# Patient Record
Sex: FEMALE | Race: WHITE | NOT HISPANIC OR LATINO | Employment: FULL TIME | URBAN - METROPOLITAN AREA
[De-identification: names, ages, dates, MRNs, and addresses within clinical notes are randomized per-mention and may not be internally consistent; named-entity substitution may affect disease eponyms.]

---

## 2017-06-02 ENCOUNTER — OFFICE VISIT (OUTPATIENT)
Dept: CARDIOLOGY | Facility: CLINIC | Age: 57
End: 2017-06-02

## 2017-06-02 VITALS
DIASTOLIC BLOOD PRESSURE: 80 MMHG | HEIGHT: 68 IN | BODY MASS INDEX: 22.28 KG/M2 | HEART RATE: 53 BPM | SYSTOLIC BLOOD PRESSURE: 118 MMHG | WEIGHT: 147 LBS

## 2017-06-02 DIAGNOSIS — I49.3 PVC'S (PREMATURE VENTRICULAR CONTRACTIONS): ICD-10-CM

## 2017-06-02 DIAGNOSIS — R00.2 HEART PALPITATIONS: Primary | ICD-10-CM

## 2017-06-02 PROCEDURE — 93000 ELECTROCARDIOGRAM COMPLETE: CPT | Performed by: NURSE PRACTITIONER

## 2017-06-02 PROCEDURE — 99203 OFFICE O/P NEW LOW 30 MIN: CPT | Performed by: NURSE PRACTITIONER

## 2017-06-02 RX ORDER — ACEBUTOLOL HYDROCHLORIDE 200 MG/1
200 CAPSULE ORAL DAILY
Qty: 90 CAPSULE | Refills: 3 | Status: SHIPPED | OUTPATIENT
Start: 2017-06-02 | End: 2018-06-03 | Stop reason: SDUPTHER

## 2017-06-02 RX ORDER — ACEBUTOLOL HYDROCHLORIDE 200 MG/1
CAPSULE ORAL
Refills: 5 | COMMUNITY
Start: 2017-05-07 | End: 2017-06-02 | Stop reason: SDUPTHER

## 2017-06-02 NOTE — PROGRESS NOTES
Date of Office Visit: 2017  Encounter Provider: CAYETANO Geller  Place of Service: [facility] CARDIOLOGY  Patient Name: Toshia Kebede  :1960    Chief Complaint   Patient presents with   • Palpitations     PVC's   :     HPI: Toshia Kebede is a 56 y.o. female who is a new patient her to establish care. 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 She has followed with Dr. Reeves but wants to establish care here. I have Dr. Reeves's records for review. He has treated her for symptomatic, frequent PVC's. Due to her history of Raynaud's she was tried on verapamil but she had a reaction so she was placed back on acebutolol and has done well on this medication from a cardiac standpoint but her rheumatologist is concerned about her being on a beta blocker with her Raynaud's. She had a stress echo in 2016 which was normal, no ischemia, normal LV systolic function, EF 63%, no atrial enlargement or valvular heart disease. She denies chest pain, shortness of breath, dizziness or syncope. She exercises regularly. She says that she does still have palpitations but they are tolerable and much improved on the acebutolol. As far as her Raynaud's disease, she tried to control her environment and has done well except for one episode where she had to be outside by the river for about 3 hours.       Past Medical History:   Diagnosis Date   • Acne    • Breast cyst    • Disease of thyroid gland    • Fibromuscular dysplasia    • Hypertension    • Hypothyroidism    • Raynaud's disease    • Sjogren's syndrome    • URI (upper respiratory infection)        Past Surgical History:   Procedure Laterality Date   • AUGMENTATION MAMMAPLASTY     • BREAST AUGMENTATION     • WISDOM TOOTH EXTRACTION         Social History     Social History   • Marital status:      Spouse name: N/A   • Number of children: N/A    • Years of education: N/A     Occupational History   • Not on file.     Social History Main Topics   • Smoking status: Never Smoker   • Smokeless tobacco: Not on file   • Alcohol use Yes      Comment: occasionally   • Drug use: No   • Sexual activity: Not on file     Other Topics Concern   • Not on file     Social History Narrative       Family History   Problem Relation Age of Onset   • Hyperlipidemia Mother    • Osteoporosis Mother    • Aneurysm Father      cerebral artery   • Diabetes Father    • Cancer Maternal Aunt      breast   • Heart attack Maternal Grandfather        Review of Systems   Constitution: Negative for chills, fever, malaise/fatigue, weight gain and weight loss.   HENT: Negative for ear pain, headaches, hearing loss, nosebleeds and sore throat.    Eyes: Negative for double vision, pain and visual disturbance.   Cardiovascular: Positive for palpitations. Negative for chest pain, dyspnea on exertion, irregular heartbeat, leg swelling, near-syncope, orthopnea, paroxysmal nocturnal dyspnea and syncope.   Respiratory: Negative for cough, shortness of breath, sleep disturbances due to breathing, snoring and wheezing.    Endocrine: Negative for cold intolerance, heat intolerance and polyuria.   Hematologic/Lymphatic: Negative.    Skin: Negative for itching and rash.   Musculoskeletal: Positive for joint pain. Negative for joint swelling and myalgias.        Raynaud's   Gastrointestinal: Negative for abdominal pain, diarrhea, melena, nausea and vomiting.   Genitourinary: Negative for frequency, hematuria and hesitancy.   Neurological: Negative for excessive daytime sleepiness, light-headedness, numbness, paresthesias and seizures.   Psychiatric/Behavioral: Negative for altered mental status and depression.   Allergic/Immunologic: Negative.    All other systems reviewed and are negative.      Allergies   Allergen Reactions   • Plendil [Felodipine] Swelling     Flushed in face         Current Outpatient  "Prescriptions:   •  acebutolol (SECTRAL) 200 MG capsule, Take 1 capsule by mouth Daily., Disp: 90 capsule, Rfl: 3  •  aspirin 81 MG EC tablet, Take 81 mg by mouth daily., Disp: , Rfl:   •  calcium carbonate-cholecalciferol 500-400 MG-UNIT tablet tablet, Take  by mouth daily., Disp: , Rfl:   •  cholecalciferol (VITAMIN D3) 1000 UNITS tablet, Take 1,000 Units by mouth daily., Disp: , Rfl:   •  estradiol-norethindrone (ACTIVELLA) 1-0.5 MG per tablet, Take 1 tablet by mouth daily., Disp: , Rfl:   •  levothyroxine (SYNTHROID, LEVOTHROID) 112 MCG tablet, Take 112 mcg by mouth daily., Disp: , Rfl:   •  spironolactone (ALDACTONE) 100 MG tablet, Take 100 mg by mouth daily., Disp: , Rfl:      Objective:     Vitals:    06/02/17 0827   BP: 118/80   Pulse: 53   Weight: 147 lb (66.7 kg)   Height: 68\" (172.7 cm)     Body mass index is 22.35 kg/(m^2).    PHYSICAL EXAM:    Vitals Reviewed.   General Appearance: No acute distress, well developed and well nourished.   Eyes: Conjunctiva and lids: No erythema, swelling, or discharge. Sclera non-icteric.   HENT: Atraumatic, normocephalic. External eyes, ears, and nose normal. No hearing loss noted. Mucous membranes normal. Lips not cyanotic. Neck supple with no tenderness.  Respiratory: No signs of respiratory distress. Respiration rhythm and depth normal.   Clear to auscultation. No rales, crackles, rhonchi, or wheezing auscultated.   Cardiovascular:  Jugular Venous Pressure: Normal  Heart Rate and Rhythm: Normal, Heart Sounds: Normal S1 and S2. No S3 or S4 noted.  Murmurs: No murmurs noted. No rubs, thrills, or gallops.   Arterial Pulses: Carotid pulses normal. No carotid bruit noted. Posterior tibialis and dorsalis pedis pulses normal.   Lower Extremities: No edema noted.  Gastrointestinal:  Abdomen soft, non-distended, non-tender. Normal bowel sounds. No hepatomegaly.   Musculoskeletal: Normal movement of extremities  Skin and Nails: General appearance normal. No pallor, cyanosis, " diaphoresis. Skin temperature normal. No clubbing of fingernails.   Psychiatric: Patient alert and oriented to person, place, and time. Speech and behavior appropriate. Normal mood and affect.       ECG 12 Lead  Date/Time: 6/2/2017 9:27 AM  Performed by: SARKIS RAUSCH  Authorized by: SARKIS RAUSCH   Comparison: compared with previous ECG from 5/20/2016  Similar to previous ECG  Comparison to previous ECG: No PVC's on current tracing  Rhythm: sinus bradycardia  BPM: 53  Clinical impression: normal ECG  Comments: Clinical indication: PVC's, palpitations              Assessment:       Diagnosis Plan   1. Heart palpitations     2. PVC's (premature ventricular contractions)            Plan:       She is doing well on the acebutolol. She still had palpitations but nothing like before and they are tolerable. Dr. Christianson and I discussed option with her but think she stay on the beta-blocker at this time and we will see her again in 1 year or sooner should the need arise.     As always, it has been a pleasure to participate in your patient's care.      Sincerely,         CAYETANO Galvin

## 2017-07-06 ENCOUNTER — TRANSCRIBE ORDERS (OUTPATIENT)
Dept: ADMINISTRATIVE | Facility: HOSPITAL | Age: 57
End: 2017-07-06

## 2017-07-06 DIAGNOSIS — Z12.31 VISIT FOR SCREENING MAMMOGRAM: Primary | ICD-10-CM

## 2017-07-27 ENCOUNTER — APPOINTMENT (OUTPATIENT)
Dept: MAMMOGRAPHY | Facility: HOSPITAL | Age: 57
End: 2017-07-27
Attending: SPECIALIST

## 2017-08-14 ENCOUNTER — HOSPITAL ENCOUNTER (OUTPATIENT)
Dept: MAMMOGRAPHY | Facility: HOSPITAL | Age: 57
Discharge: HOME | End: 2017-08-14
Attending: SPECIALIST | Admitting: SPECIALIST

## 2017-08-14 DIAGNOSIS — Z12.31 VISIT FOR SCREENING MAMMOGRAM: ICD-10-CM

## 2017-08-14 PROCEDURE — G0202 SCR MAMMO BI INCL CAD: HCPCS

## 2018-06-04 ENCOUNTER — OFFICE VISIT (OUTPATIENT)
Dept: CARDIOLOGY | Facility: CLINIC | Age: 58
End: 2018-06-04

## 2018-06-04 VITALS
SYSTOLIC BLOOD PRESSURE: 132 MMHG | BODY MASS INDEX: 21.37 KG/M2 | DIASTOLIC BLOOD PRESSURE: 80 MMHG | HEART RATE: 56 BPM | WEIGHT: 141 LBS | HEIGHT: 68 IN

## 2018-06-04 DIAGNOSIS — I49.3 PVC'S (PREMATURE VENTRICULAR CONTRACTIONS): ICD-10-CM

## 2018-06-04 DIAGNOSIS — R00.2 HEART PALPITATIONS: Primary | ICD-10-CM

## 2018-06-04 PROCEDURE — 93000 ELECTROCARDIOGRAM COMPLETE: CPT | Performed by: INTERNAL MEDICINE

## 2018-06-04 PROCEDURE — 99213 OFFICE O/P EST LOW 20 MIN: CPT | Performed by: INTERNAL MEDICINE

## 2018-06-04 RX ORDER — ACEBUTOLOL HYDROCHLORIDE 200 MG/1
200 CAPSULE ORAL DAILY
Qty: 90 CAPSULE | Refills: 3 | Status: SHIPPED | OUTPATIENT
Start: 2018-06-04 | End: 2019-02-01 | Stop reason: SDUPTHER

## 2018-06-04 NOTE — PROGRESS NOTES
Date of Office Visit: 2018  Encounter Provider: Ethan Christianson MD  Place of Service: [facility] CARDIOLOGY  Patient Name: Toshia Kebede  : 1960    Subjective:     Encounter Date:2018      Patient ID: Toshia Kebede is a 57 y.o. female who has a cc of PVCs and palp and Raynaud's and Sjorgen's   PVCs are there but not as severe and most of the time she does not notice them except for at times of stress  BB helps a lot     The patient had a good year.   No anginal chest pain,   No sig quintaan,   No soa,   No fainting,  No orthostasis.   No edema.   Exercise tolerance: good. / runs three miles at a time     There have been no hospital admission since the last visit.     There have been no bleeding events.       Past Medical History:   Diagnosis Date   • Acne    • Breast cyst    • Disease of thyroid gland    • Fibromuscular dysplasia    • Hypertension    • Hypothyroidism    • Raynaud's disease    • Sjogren's syndrome    • URI (upper respiratory infection)        Social History     Social History   • Marital status:      Spouse name: N/A   • Number of children: N/A   • Years of education: N/A     Occupational History   • Not on file.     Social History Main Topics   • Smoking status: Never Smoker   • Smokeless tobacco: Not on file   • Alcohol use Yes      Comment: occasionally   • Drug use: No   • Sexual activity: Not on file     Other Topics Concern   • Not on file     Social History Narrative   • No narrative on file       Review of Systems   Constitution: Negative for fever and night sweats.   HENT: Negative for ear pain and stridor.    Eyes: Negative for discharge and visual halos.   Cardiovascular: Negative for cyanosis.   Respiratory: Negative for hemoptysis and sputum production.    Hematologic/Lymphatic: Negative for adenopathy.   Skin: Negative for nail changes and unusual hair distribution.   Musculoskeletal: Negative for gout and joint swelling.  "  Gastrointestinal: Negative for bowel incontinence and flatus.   Genitourinary: Negative for dysuria and flank pain.   Neurological: Negative for seizures and tremors.   Psychiatric/Behavioral: Negative for altered mental status. The patient is not nervous/anxious.             Objective:     Vitals:    06/04/18 0903   BP: 132/80   Pulse: 56   Weight: 64 kg (141 lb)   Height: 172.7 cm (68\")         Physical Exam   Constitutional: She is oriented to person, place, and time.   HENT:   Head: Normocephalic and atraumatic.   Eyes: Right eye exhibits no discharge. Left eye exhibits no discharge.   Neck: No JVD present. No thyromegaly present.   Cardiovascular: Normal rate and regular rhythm.  Exam reveals no gallop and no friction rub.    No murmur heard.  Pulmonary/Chest: Effort normal and breath sounds normal. She has no rales.   Abdominal: Soft. Bowel sounds are normal. There is no tenderness.   Musculoskeletal: Normal range of motion. She exhibits no edema or deformity.   Neurological: She is alert and oriented to person, place, and time. She exhibits normal muscle tone.   Skin: Skin is warm and dry. No erythema.   Psychiatric: She has a normal mood and affect. Her behavior is normal. Thought content normal.         ECG 12 Lead  Date/Time: 6/4/2018 9:40 AM  Performed by: PAM JOSE  Authorized by: PAM JOSE   Comparison: compared with previous ECG   Similar to previous ECG  Rhythm: sinus rhythm  Rate: normal  Conduction: conduction normal  ST Segments: ST segments normal  T Waves: T waves normal  QRS axis: normal  Clinical impression: normal ECG            Lab Review:       Assessment:          Diagnosis Plan   1. Heart palpitations     2. PVC's (premature ventricular contractions)            Plan:         She is doing fine.   PVCs under control w beta-blocker     She sees Dr barber for her FMD               "

## 2018-08-03 ENCOUNTER — TRANSCRIBE ORDERS (OUTPATIENT)
Dept: ADMINISTRATIVE | Facility: HOSPITAL | Age: 58
End: 2018-08-03

## 2018-08-03 DIAGNOSIS — Z12.31 VISIT FOR SCREENING MAMMOGRAM: Primary | ICD-10-CM

## 2018-08-15 ENCOUNTER — HOSPITAL ENCOUNTER (OUTPATIENT)
Dept: MAMMOGRAPHY | Facility: HOSPITAL | Age: 58
Discharge: HOME | End: 2018-08-15
Attending: SPECIALIST | Admitting: SPECIALIST

## 2018-08-15 DIAGNOSIS — Z12.31 VISIT FOR SCREENING MAMMOGRAM: ICD-10-CM

## 2018-08-15 PROCEDURE — 77067 SCR MAMMO BI INCL CAD: CPT

## 2018-08-15 PROCEDURE — 77063 BREAST TOMOSYNTHESIS BI: CPT

## 2019-02-01 RX ORDER — ACEBUTOLOL HYDROCHLORIDE 200 MG/1
CAPSULE ORAL
Qty: 90 CAPSULE | Refills: 1 | Status: SHIPPED | OUTPATIENT
Start: 2019-02-01 | End: 2019-05-09 | Stop reason: DRUGHIGH

## 2019-05-13 RX ORDER — ACEBUTOLOL HYDROCHLORIDE 200 MG/1
200 CAPSULE ORAL 2 TIMES DAILY
Qty: 90 CAPSULE | Refills: 1 | OUTPATIENT
Start: 2019-05-13

## 2019-06-05 ENCOUNTER — OFFICE VISIT (OUTPATIENT)
Dept: CARDIOLOGY | Facility: CLINIC | Age: 59
End: 2019-06-05

## 2019-06-05 VITALS
BODY MASS INDEX: 20.92 KG/M2 | DIASTOLIC BLOOD PRESSURE: 80 MMHG | WEIGHT: 138 LBS | HEIGHT: 68 IN | SYSTOLIC BLOOD PRESSURE: 120 MMHG | HEART RATE: 61 BPM

## 2019-06-05 DIAGNOSIS — R00.2 HEART PALPITATIONS: ICD-10-CM

## 2019-06-05 DIAGNOSIS — I49.3 PVC'S (PREMATURE VENTRICULAR CONTRACTIONS): Primary | ICD-10-CM

## 2019-06-05 PROCEDURE — 93000 ELECTROCARDIOGRAM COMPLETE: CPT | Performed by: NURSE PRACTITIONER

## 2019-06-05 PROCEDURE — 99213 OFFICE O/P EST LOW 20 MIN: CPT | Performed by: NURSE PRACTITIONER

## 2019-06-05 RX ORDER — ACEBUTOLOL HYDROCHLORIDE 200 MG/1
200 CAPSULE ORAL DAILY
Qty: 90 CAPSULE | Refills: 3 | Status: SHIPPED | OUTPATIENT
Start: 2019-06-05 | End: 2019-08-16 | Stop reason: SDUPTHER

## 2019-06-05 RX ORDER — ACEBUTOLOL HYDROCHLORIDE 200 MG/1
200 CAPSULE ORAL 2 TIMES DAILY
Refills: 1 | COMMUNITY
Start: 2019-05-21 | End: 2019-06-05

## 2019-06-05 RX ORDER — ACEBUTOLOL HYDROCHLORIDE 200 MG/1
200 CAPSULE ORAL DAILY
COMMUNITY
End: 2019-06-05 | Stop reason: SDUPTHER

## 2019-06-05 NOTE — PROGRESS NOTES
Date of Office Visit: 2019  Encounter Provider: CAYETANO Geller  Place of Service: [facility] CARDIOLOGY  Patient Name: Toshia Kebede  :1960    Chief Complaint   Patient presents with   • Palpitations   :     HPI: Toshia Kebede is a 58 y.o. female who is a patient of Dr. Tobias with a history of PVCs and palpitations as well as Raynaud's and Sjorgen's.  She presents today for annual follow-up.    She reports that she is doing well.  She still has some rotations which she notices mostly when she is under a significant amount of stress or if she forgets to take her medication.  She says they are well controlled on the acebutolol.  She denies any chest pain, significant dyspnea, PND, orthopnea or edema.    She exercises regularly without difficulty.     Past Medical History:   Diagnosis Date   • Acne    • Breast cyst    • Disease of thyroid gland    • Fibromuscular dysplasia (CMS/HCC)    • Heart palpitations    • Hypertension    • Hypothyroidism    • PVC's (premature ventricular contractions)    • Raynaud's disease    • Sjogren's syndrome (CMS/HCC)    • URI (upper respiratory infection)        Past Surgical History:   Procedure Laterality Date   • AUGMENTATION MAMMAPLASTY     • BREAST AUGMENTATION     • WISDOM TOOTH EXTRACTION         Social History     Socioeconomic History   • Marital status:      Spouse name: Not on file   • Number of children: Not on file   • Years of education: Not on file   • Highest education level: Not on file   Tobacco Use   • Smoking status: Never Smoker   Substance and Sexual Activity   • Alcohol use: Yes     Comment: occasionally   • Drug use: No       Family History   Problem Relation Age of Onset   • Hyperlipidemia Mother    • Osteoporosis Mother    • Aneurysm Father         cerebral artery   • Diabetes Father    • Breast cancer Maternal Aunt    • Heart attack Maternal Grandfather        Review of Systems   Constitution:  "Negative for chills, fever and malaise/fatigue.   Cardiovascular: Positive for palpitations. Negative for chest pain, dyspnea on exertion, leg swelling, near-syncope, orthopnea, paroxysmal nocturnal dyspnea and syncope.   Respiratory: Negative for cough and shortness of breath.    Musculoskeletal: Negative for joint pain, joint swelling and myalgias.   Gastrointestinal: Negative for abdominal pain, diarrhea, melena, nausea and vomiting.   Genitourinary: Negative for frequency and hematuria.   Neurological: Negative for light-headedness, numbness, paresthesias and seizures.   Allergic/Immunologic: Negative.    All other systems reviewed and are negative.      Allergies   Allergen Reactions   • Plendil [Felodipine] Swelling     Flushed in face         Current Outpatient Medications:   •  acebutolol (SECTRAL) 200 MG capsule, Take 1 capsule by mouth Daily., Disp: 90 capsule, Rfl: 3  •  aspirin 81 MG EC tablet, Take 81 mg by mouth daily., Disp: , Rfl:   •  calcium carbonate-cholecalciferol 500-400 MG-UNIT tablet tablet, Take  by mouth daily., Disp: , Rfl:   •  cholecalciferol (VITAMIN D3) 1000 UNITS tablet, Take 1,000 Units by mouth daily., Disp: , Rfl:   •  estradiol-norethindrone (ACTIVELLA) 1-0.5 MG per tablet, Take 1 tablet by mouth daily., Disp: , Rfl:   •  levothyroxine (SYNTHROID, LEVOTHROID) 112 MCG tablet, Take 112 mcg by mouth daily., Disp: , Rfl:   •  Multiple Vitamin (MULTI-VITAMIN DAILY PO), Take  by mouth., Disp: , Rfl:   •  spironolactone (ALDACTONE) 100 MG tablet, Take 100 mg by mouth daily., Disp: , Rfl:       Objective:     Vitals:    06/05/19 0933   BP: 120/80   Pulse: 61   Weight: 62.6 kg (138 lb)   Height: 172.7 cm (67.99\")     Body mass index is 20.99 kg/m².    PHYSICAL EXAM:    Vitals Reviewed.   General Appearance: No acute distress, well developed and well nourished.   Eyes: Conjunctiva and lids: No erythema, swelling, or discharge. Sclera non-icteric.   HENT: Atraumatic, normocephalic. External " eyes, ears, and nose normal.   Respiratory: No signs of respiratory distress. Respiration rhythm and depth normal.   Clear to auscultation. No rales, crackles, rhonchi, or wheezing auscultated.   Cardiovascular:  Jugular Venous Pressure: Normal  Heart Rate and Rhythm: Normal, Heart Sounds: Normal S1 and S2. No S3 or S4 noted.  Murmurs: No murmurs noted. No rubs, thrills, or gallops.   Arterial Pulses:  Posterior tibialis and dorsalis pedis pulses normal.   Lower Extremities: No edema noted.  Gastrointestinal:  Abdomen soft, non-distended, non-tender.   Musculoskeletal: Normal movement of extremities  Skin and Nails: General appearance normal. No pallor, cyanosis, diaphoresis. Skin temperature normal. No clubbing of fingernails.   Psychiatric: Patient alert and oriented to person, place, and time. Speech and behavior appropriate. Normal mood and affect.       ECG 12 Lead  Date/Time: 6/5/2019 9:53 AM  Performed by: Joycelyn Patino APRN  Authorized by: Joycelyn Patino APRN   Comparison: compared with previous ECG from 6/4/2019  Similar to previous ECG  Rhythm: sinus rhythm  BPM: 61                Assessment:       Diagnosis Plan   1. PVC's (premature ventricular contractions)     2. Heart palpitations            Plan:       1.-2.  PVCs, palpitations--- well controlled on acebutolol.    Follow-up with Dr. Jennings in 1 year or sooner should the need arise.    As always, it has been a pleasure to participate in your patient's care.      Sincerely,         CAYETANO Galvin

## 2019-08-16 RX ORDER — ACEBUTOLOL HYDROCHLORIDE 200 MG/1
200 CAPSULE ORAL DAILY
Qty: 90 CAPSULE | Refills: 3 | Status: SHIPPED | OUTPATIENT
Start: 2019-08-16 | End: 2020-09-16

## 2019-08-30 ENCOUNTER — TRANSCRIBE ORDERS (OUTPATIENT)
Dept: ADMINISTRATIVE | Facility: HOSPITAL | Age: 59
End: 2019-08-30

## 2019-08-30 DIAGNOSIS — Z12.31 VISIT FOR SCREENING MAMMOGRAM: Primary | ICD-10-CM

## 2019-09-17 ENCOUNTER — HOSPITAL ENCOUNTER (OUTPATIENT)
Dept: MAMMOGRAPHY | Facility: HOSPITAL | Age: 59
Discharge: HOME | End: 2019-09-17
Admitting: SPECIALIST

## 2019-09-17 DIAGNOSIS — Z12.31 VISIT FOR SCREENING MAMMOGRAM: ICD-10-CM

## 2019-09-17 PROCEDURE — 77063 BREAST TOMOSYNTHESIS BI: CPT

## 2019-09-17 PROCEDURE — 77067 SCR MAMMO BI INCL CAD: CPT

## 2020-06-08 ENCOUNTER — OFFICE VISIT (OUTPATIENT)
Dept: CARDIOLOGY | Facility: CLINIC | Age: 60
End: 2020-06-08

## 2020-06-08 VITALS
SYSTOLIC BLOOD PRESSURE: 130 MMHG | HEIGHT: 68 IN | WEIGHT: 141 LBS | DIASTOLIC BLOOD PRESSURE: 82 MMHG | HEART RATE: 44 BPM | BODY MASS INDEX: 21.37 KG/M2

## 2020-06-08 DIAGNOSIS — R00.2 HEART PALPITATIONS: ICD-10-CM

## 2020-06-08 DIAGNOSIS — I49.3 PVC'S (PREMATURE VENTRICULAR CONTRACTIONS): Primary | ICD-10-CM

## 2020-06-08 PROCEDURE — 99213 OFFICE O/P EST LOW 20 MIN: CPT | Performed by: INTERNAL MEDICINE

## 2020-06-08 PROCEDURE — 93000 ELECTROCARDIOGRAM COMPLETE: CPT | Performed by: INTERNAL MEDICINE

## 2020-06-08 NOTE — PROGRESS NOTES
Date of Office Visit: 2020  Encounter Provider: Ethan Christianson MD  Place of Service: [facility] CARDIOLOGY  Patient Name: Toshia Kebede  : 1960    Subjective:     Encounter Date:2020      Patient ID: Toshia Kebede is a 59 y.o. female who has a cc of  PVCs that she reports are under control w meds.     If I forget the meds she feels skips.     She has fibromuscular dysplasia of the carotids and she follows with dr evangelina zamora On low dose asa. Asa helps her.     Few episodes of vertigo. That was positional.         The patient had a good year.   No anginal chest pain,   No sig quintana,   No soa,   No fainting,  No orthostasis.   No edema.   Exercise tolerance: she can run.     There have been no hospital admission since the last visit.     There have been no bleeding events.       Past Medical History:   Diagnosis Date   • Acne    • Breast cyst    • Disease of thyroid gland    • Fibromuscular dysplasia (CMS/HCC)    • Heart palpitations    • Hypertension    • Hypothyroidism    • PVC's (premature ventricular contractions)    • Raynaud's disease    • Sjogren's syndrome (CMS/HCC)    • URI (upper respiratory infection)        Social History     Socioeconomic History   • Marital status:      Spouse name: Not on file   • Number of children: Not on file   • Years of education: Not on file   • Highest education level: Not on file   Tobacco Use   • Smoking status: Never Smoker   • Smokeless tobacco: Never Used   Substance and Sexual Activity   • Alcohol use: Yes     Comment: occasionally   • Drug use: No       Review of Systems   Constitution: Negative for fever and night sweats.   HENT: Negative for ear pain and stridor.    Eyes: Negative for discharge and visual halos.   Cardiovascular: Negative for cyanosis.   Respiratory: Negative for hemoptysis and sputum production.    Hematologic/Lymphatic: Negative for adenopathy.   Skin: Negative for nail changes and unusual hair  "distribution.   Musculoskeletal: Negative for gout and joint swelling.   Gastrointestinal: Negative for bowel incontinence and flatus.   Genitourinary: Negative for dysuria and flank pain.   Neurological: Negative for seizures and tremors.   Psychiatric/Behavioral: Negative for altered mental status. The patient is not nervous/anxious.             Objective:     Vitals:    06/08/20 1159   BP: 130/82   BP Location: Right arm   Patient Position: Sitting   Cuff Size: Large Adult   Pulse: (!) 44   Weight: 64 kg (141 lb)   Height: 172.7 cm (68\")         Physical Exam   Constitutional: She is oriented to person, place, and time.   HENT:   Head: Normocephalic and atraumatic.   Eyes: Right eye exhibits no discharge. Left eye exhibits no discharge.   Neck: No JVD present. No thyromegaly present.   Cardiovascular: Normal rate and regular rhythm. Exam reveals no gallop and no friction rub.   No murmur heard.  Pulmonary/Chest: Effort normal and breath sounds normal. She has no rales.   Abdominal: Soft. Bowel sounds are normal. There is no tenderness.   Musculoskeletal: Normal range of motion. She exhibits no edema or deformity.   Neurological: She is alert and oriented to person, place, and time. She exhibits normal muscle tone.   Skin: Skin is warm and dry. No erythema.   Psychiatric: She has a normal mood and affect. Her behavior is normal. Thought content normal.         ECG 12 Lead  Date/Time: 6/8/2020 12:19 PM  Performed by: Ethan Christianson MD  Authorized by: Ethan Christianson MD   Comparison: compared with previous ECG   Similar to previous ECG  Rhythm: sinus rhythm  Rate: normal  Conduction: conduction normal  ST Segments: ST segments normal  T Waves: T waves normal  QRS axis: normal    Clinical impression: normal ECG            Lab Review:       Assessment:          Diagnosis Plan   1. PVC's (premature ventricular contractions)     2. Heart palpitations            Plan:     PVCs well controlled     EXam and ecg normal "     Vasc following the carotids

## 2020-09-16 RX ORDER — ACEBUTOLOL HYDROCHLORIDE 200 MG/1
CAPSULE ORAL
Qty: 90 CAPSULE | Refills: 3 | Status: SHIPPED | OUTPATIENT
Start: 2020-09-16 | End: 2021-09-23

## 2020-12-23 ENCOUNTER — TRANSCRIBE ORDERS (OUTPATIENT)
Dept: ADMINISTRATIVE | Facility: HOSPITAL | Age: 60
End: 2020-12-23

## 2020-12-23 DIAGNOSIS — Z12.31 VISIT FOR SCREENING MAMMOGRAM: Primary | ICD-10-CM

## 2020-12-23 DIAGNOSIS — Z78.0 MENOPAUSE: ICD-10-CM

## 2021-03-02 ENCOUNTER — HOSPITAL ENCOUNTER (OUTPATIENT)
Dept: MAMMOGRAPHY | Facility: HOSPITAL | Age: 61
Discharge: HOME | End: 2021-03-02
Admitting: SPECIALIST

## 2021-03-02 DIAGNOSIS — Z12.31 VISIT FOR SCREENING MAMMOGRAM: ICD-10-CM

## 2021-03-02 PROCEDURE — 77063 BREAST TOMOSYNTHESIS BI: CPT

## 2021-03-02 PROCEDURE — 77067 SCR MAMMO BI INCL CAD: CPT

## 2021-03-04 ENCOUNTER — HOSPITAL ENCOUNTER (OUTPATIENT)
Dept: BONE DENSITY | Facility: HOSPITAL | Age: 61
Discharge: HOME | End: 2021-03-04
Admitting: SPECIALIST

## 2021-03-04 DIAGNOSIS — Z78.0 MENOPAUSE: ICD-10-CM

## 2021-03-04 PROCEDURE — 77080 DXA BONE DENSITY AXIAL: CPT

## 2021-03-22 ENCOUNTER — BULK ORDERING (OUTPATIENT)
Dept: CASE MANAGEMENT | Facility: OTHER | Age: 61
End: 2021-03-22

## 2021-03-22 DIAGNOSIS — Z23 IMMUNIZATION DUE: ICD-10-CM

## 2021-06-14 ENCOUNTER — OFFICE VISIT (OUTPATIENT)
Dept: CARDIOLOGY | Facility: CLINIC | Age: 61
End: 2021-06-14

## 2021-06-14 VITALS
DIASTOLIC BLOOD PRESSURE: 84 MMHG | SYSTOLIC BLOOD PRESSURE: 140 MMHG | BODY MASS INDEX: 22.13 KG/M2 | HEIGHT: 68 IN | HEART RATE: 48 BPM | WEIGHT: 146 LBS

## 2021-06-14 DIAGNOSIS — R00.2 HEART PALPITATIONS: ICD-10-CM

## 2021-06-14 DIAGNOSIS — I49.3 PVC'S (PREMATURE VENTRICULAR CONTRACTIONS): Primary | ICD-10-CM

## 2021-06-14 PROCEDURE — 93000 ELECTROCARDIOGRAM COMPLETE: CPT | Performed by: INTERNAL MEDICINE

## 2021-06-14 PROCEDURE — 99213 OFFICE O/P EST LOW 20 MIN: CPT | Performed by: INTERNAL MEDICINE

## 2021-06-14 RX ORDER — LEVOTHYROXINE SODIUM 125 MCG
1 TABLET ORAL DAILY
COMMUNITY
Start: 2021-06-09 | End: 2023-03-29

## 2021-09-23 RX ORDER — ACEBUTOLOL HYDROCHLORIDE 200 MG/1
CAPSULE ORAL
Qty: 90 CAPSULE | Refills: 3 | Status: SHIPPED | OUTPATIENT
Start: 2021-09-23 | End: 2022-10-03

## 2021-12-27 ENCOUNTER — TRANSCRIBE ORDERS (OUTPATIENT)
Dept: ADMINISTRATIVE | Facility: HOSPITAL | Age: 61
End: 2021-12-27

## 2021-12-27 DIAGNOSIS — Z12.31 VISIT FOR SCREENING MAMMOGRAM: ICD-10-CM

## 2022-03-03 ENCOUNTER — HOSPITAL ENCOUNTER (OUTPATIENT)
Dept: MAMMOGRAPHY | Facility: HOSPITAL | Age: 62
Discharge: HOME | End: 2022-03-03
Admitting: SPECIALIST

## 2022-03-03 DIAGNOSIS — Z12.31 VISIT FOR SCREENING MAMMOGRAM: ICD-10-CM

## 2022-03-03 PROCEDURE — 77063 BREAST TOMOSYNTHESIS BI: CPT

## 2022-03-03 PROCEDURE — 77067 SCR MAMMO BI INCL CAD: CPT

## 2022-05-27 ENCOUNTER — IMMUNIZATION (OUTPATIENT)
Dept: VACCINE CLINIC | Facility: HOSPITAL | Age: 62
End: 2022-05-27

## 2022-05-27 DIAGNOSIS — Z23 NEED FOR VACCINATION: Primary | ICD-10-CM

## 2022-05-27 PROCEDURE — 0054A HC ADM SARSCV2 30MCG TRS-SUCR BOOSTER: CPT | Performed by: INTERNAL MEDICINE

## 2022-05-27 PROCEDURE — 91305 HC SARSCOV2 VAC 30 MCG TRS-SUCR PFIZER: CPT | Performed by: INTERNAL MEDICINE

## 2022-08-02 ENCOUNTER — OFFICE VISIT (OUTPATIENT)
Dept: CARDIOLOGY | Facility: CLINIC | Age: 62
End: 2022-08-02

## 2022-08-02 VITALS
HEIGHT: 68 IN | HEART RATE: 51 BPM | DIASTOLIC BLOOD PRESSURE: 60 MMHG | BODY MASS INDEX: 21.52 KG/M2 | SYSTOLIC BLOOD PRESSURE: 110 MMHG | WEIGHT: 142 LBS

## 2022-08-02 DIAGNOSIS — I49.3 PVC'S (PREMATURE VENTRICULAR CONTRACTIONS): Primary | ICD-10-CM

## 2022-08-02 DIAGNOSIS — R00.2 HEART PALPITATIONS: ICD-10-CM

## 2022-08-02 PROCEDURE — 93000 ELECTROCARDIOGRAM COMPLETE: CPT | Performed by: NURSE PRACTITIONER

## 2022-08-02 PROCEDURE — 99213 OFFICE O/P EST LOW 20 MIN: CPT | Performed by: NURSE PRACTITIONER

## 2022-08-02 NOTE — PROGRESS NOTES
"Date of Office Visit: 2022  Encounter Provider: CAYETANO Geller  Place of Service: [facility] CARDIOLOGY  Patient Name: Toshia Kebede  :1960    Chief Complaint   Patient presents with   • PVC's     1 year f/u   • Palpitations   :     HPI: Toshia Kebede is a 61 y.o. female who follows with Dr. Christianson for PVC's and palpitations. She also has Raynaud's and Sjorgen's.     Presents for annual follow up.      She has had a good year. Palps/pvc's well controlled on acebutolol.     \"I know if I don't take it.\"    She had not had any chest pain, dyspnea, PND, orthopnea, dizziness or edema--until this past weekend, she lifted her 30 lbs grandson and felt a brief chest discomfort over left breast area--resolved quickly, she went to a wedding/dancing that evening, no issues but had some brief similar sensations on ---no associated symptoms.     Suspect musculoskeletal.    She was a running about 3 miles regularly but has really gotten off track since she has been watching the [employer]---she is going to try and get back to regular exercise.      Past Medical History:   Diagnosis Date   • Acne    • Breast cyst    • Disease of thyroid gland    • Fibromuscular dysplasia (HCC)    • Heart palpitations    • Hypertension    • Hypothyroidism    • PVC's (premature ventricular contractions)    • Raynaud's disease    • Sjogren's syndrome (HCC)    • URI (upper respiratory infection)        Past Surgical History:   Procedure Laterality Date   • AUGMENTATION MAMMAPLASTY Bilateral     at age 16   • BREAST AUGMENTATION     • WISDOM TOOTH EXTRACTION         Social History     Socioeconomic History   • Marital status:    Tobacco Use   • Smoking status: Never Smoker   • Smokeless tobacco: Never Used   Vaping Use   • Vaping Use: Never used   Substance and Sexual Activity   • Alcohol use: Yes     Comment: occasionally   • Drug use: No   • Sexual activity: Defer     Birth " control/protection: None       Family History   Problem Relation Age of Onset   • Hyperlipidemia Mother    • Osteoporosis Mother    • Aneurysm Father         cerebral artery   • Diabetes Father    • Breast cancer Maternal Aunt    • Heart attack Maternal Grandfather    • Ovarian cancer Neg Hx        Review of Systems   Constitutional: Negative for chills, fever and malaise/fatigue.   Cardiovascular: Negative for chest pain, dyspnea on exertion, leg swelling, near-syncope, orthopnea, palpitations, paroxysmal nocturnal dyspnea and syncope.   Respiratory: Negative for cough and shortness of breath.    Musculoskeletal: Negative for joint pain, joint swelling and myalgias.   Gastrointestinal: Negative for abdominal pain, diarrhea, melena, nausea and vomiting.   Genitourinary: Negative for frequency and hematuria.   Neurological: Negative for light-headedness, numbness, paresthesias and seizures.   Allergic/Immunologic: Negative.    All other systems reviewed and are negative.      Allergies   Allergen Reactions   • Plendil [Felodipine] Swelling     Flushed in face         Current Outpatient Medications:   •  acebutolol (SECTRAL) 200 MG capsule, TAKE 1 CAPSULE BY MOUTH EVERY DAY, Disp: 90 capsule, Rfl: 3  •  aspirin 81 MG EC tablet, Take 81 mg by mouth daily., Disp: , Rfl:   •  calcium carbonate-cholecalciferol 500-400 MG-UNIT tablet tablet, Take  by mouth daily., Disp: , Rfl:   •  cholecalciferol (VITAMIN D3) 1000 UNITS tablet, Take 1,000 Units by mouth daily., Disp: , Rfl:   •  estradiol-norethindrone (ACTIVELLA) 1-0.5 MG per tablet, Take 0.5 tablets by mouth Daily., Disp: , Rfl:   •  Multiple Vitamin (MULTI-VITAMIN DAILY PO), Take  by mouth., Disp: , Rfl:   •  spironolactone (ALDACTONE) 100 MG tablet, Take 200 mg by mouth Daily., Disp: , Rfl:   •  Synthroid 125 MCG tablet, Take 1 tablet by mouth Daily., Disp: , Rfl:       Objective:     Vitals:    08/02/22 1149   BP: 110/60   BP Location: Right arm   Patient Position:  "Sitting   Pulse: 51   Weight: 64.4 kg (142 lb)   Height: 172.7 cm (68\")     Body mass index is 21.59 kg/m².    PHYSICAL EXAM:    Vitals Reviewed.   General Appearance: No acute distress, well developed and well nourished.   Eyes: Conjunctiva and lids: No erythema, swelling, or discharge. Sclera non-icteric.   HENT: Atraumatic, normocephalic. External eyes, ears, and nose normal.   Respiratory: No signs of respiratory distress. Respiration rhythm and depth normal.   Clear to auscultation. No rales, crackles, rhonchi, or wheezing auscultated.   Cardiovascular:  Heart Rate and Rhythm: Normal, Heart Sounds: Normal S1 and S2. No S3 or S4 noted.  Murmurs: No murmurs noted. No rubs, thrills, or gallops.   Arterial Pulses:  Posterior tibialis and dorsalis pedis pulses normal.   Lower Extremities: No edema noted.  Gastrointestinal:  Abdomen soft, non-distended, non-tender.   Musculoskeletal: Normal movement of extremities  Skin: Warm and dry.   Psychiatric: Patient alert and oriented to person, place, and time. Speech and behavior appropriate. Normal mood and affect.       ECG 12 Lead    Date/Time: 8/2/2022 12:15 PM  Performed by: Joycelyn Patino APRN  Authorized by: Joycelyn Patino APRN   Comparison: compared with previous ECG   Similar to previous ECG  Rhythm: sinus rhythm  BPM: 51                Assessment:       Diagnosis Plan   1. PVC's (premature ventricular contractions)     2. Heart palpitations            Plan:       1-2. PVC's, palps---well controlled on acebutolol.     Follow up with Dr. Christianson in 1 year.     As always, it has been a pleasure to participate in your patient's care.      Sincerely,         CAYETANO Galvin  "

## 2022-10-03 RX ORDER — ACEBUTOLOL HYDROCHLORIDE 200 MG/1
CAPSULE ORAL
Qty: 90 CAPSULE | Refills: 3 | Status: SHIPPED | OUTPATIENT
Start: 2022-10-03

## 2022-12-29 ENCOUNTER — TRANSCRIBE ORDERS (OUTPATIENT)
Dept: ADMINISTRATIVE | Facility: HOSPITAL | Age: 62
End: 2022-12-29
Payer: COMMERCIAL

## 2022-12-29 DIAGNOSIS — Z12.31 VISIT FOR SCREENING MAMMOGRAM: Primary | ICD-10-CM

## 2023-03-06 ENCOUNTER — HOSPITAL ENCOUNTER (OUTPATIENT)
Dept: MAMMOGRAPHY | Facility: HOSPITAL | Age: 63
Discharge: HOME | End: 2023-03-06
Admitting: SPECIALIST
Payer: COMMERCIAL

## 2023-03-06 DIAGNOSIS — Z12.31 VISIT FOR SCREENING MAMMOGRAM: ICD-10-CM

## 2023-03-06 PROCEDURE — 77063 BREAST TOMOSYNTHESIS BI: CPT

## 2023-03-06 PROCEDURE — 77067 SCR MAMMO BI INCL CAD: CPT

## 2023-03-29 DIAGNOSIS — E03.9 HYPOTHYROIDISM, UNSPECIFIED TYPE: Primary | ICD-10-CM

## 2023-03-29 NOTE — TELEPHONE ENCOUNTER
Rx Refill Note  Requested Prescriptions     Pending Prescriptions Disp Refills   • Synthroid 125 MCG tablet [Pharmacy Med Name: SYNTHROID 0.125MG (125MCG) TABLETS] 30 tablet      Sig: TAKE 1 TABLET BY MOUTH EVERY DAY      Last office visit with prescribing clinician: 8/30/2022  Last labs: 9/1/2022  (Info found on [facility])     Next office visit with prescribing clinician: Not scheduled yet      Parul Jordan MA  03/29/23, 11:28 EDT

## 2023-03-30 RX ORDER — LEVOTHYROXINE SODIUM 125 MCG
TABLET ORAL
Qty: 30 TABLET | Refills: 4 | Status: SHIPPED | OUTPATIENT
Start: 2023-03-30

## 2023-08-04 ENCOUNTER — OFFICE VISIT (OUTPATIENT)
Dept: CARDIOLOGY | Facility: CLINIC | Age: 63
End: 2023-08-04
Payer: COMMERCIAL

## 2023-08-04 VITALS
HEIGHT: 68 IN | BODY MASS INDEX: 21.67 KG/M2 | HEART RATE: 52 BPM | SYSTOLIC BLOOD PRESSURE: 136 MMHG | DIASTOLIC BLOOD PRESSURE: 69 MMHG | WEIGHT: 143 LBS

## 2023-08-04 DIAGNOSIS — I49.3 PVC'S (PREMATURE VENTRICULAR CONTRACTIONS): Primary | ICD-10-CM

## 2023-08-04 DIAGNOSIS — R00.2 HEART PALPITATIONS: ICD-10-CM

## 2023-08-04 PROCEDURE — 93000 ELECTROCARDIOGRAM COMPLETE: CPT | Performed by: INTERNAL MEDICINE

## 2023-08-04 PROCEDURE — 99213 OFFICE O/P EST LOW 20 MIN: CPT | Performed by: INTERNAL MEDICINE

## 2023-08-04 RX ORDER — SILDENAFIL CITRATE 20 MG/1
1 TABLET ORAL DAILY
COMMUNITY
Start: 2023-07-26

## 2023-09-05 DIAGNOSIS — E03.9 HYPOTHYROIDISM, UNSPECIFIED TYPE: ICD-10-CM

## 2023-09-05 RX ORDER — LEVOTHYROXINE SODIUM 125 MCG
TABLET ORAL
Qty: 30 TABLET | Refills: 0 | Status: SHIPPED | OUTPATIENT
Start: 2023-09-05

## 2023-09-05 NOTE — TELEPHONE ENCOUNTER
Rx Refill Note  Requested Prescriptions     Pending Prescriptions Disp Refills    Synthroid 125 MCG tablet [Pharmacy Med Name: SYNTHROID 0.125MG (125MCG) TABLETS] 30 tablet 4     Sig: TAKE 1 TABLET BY MOUTH EVERY DAY      Last office visit with prescribing clinician: Visit date not found   Last telemedicine visit with prescribing clinician: Visit date not found   Next office visit with prescribing clinician: Visit date not found                         Would you like a call back once the refill request has been completed: [] Yes [] No    If the office needs to give you a call back, can they leave a voicemail: [] Yes [] No    Bridget Sorto MA  09/05/23, 08:05 EDT

## 2023-09-08 ENCOUNTER — OFFICE VISIT (OUTPATIENT)
Dept: FAMILY MEDICINE CLINIC | Facility: CLINIC | Age: 63
End: 2023-09-08
Payer: COMMERCIAL

## 2023-09-08 VITALS
HEIGHT: 68 IN | WEIGHT: 141.2 LBS | SYSTOLIC BLOOD PRESSURE: 131 MMHG | HEART RATE: 52 BPM | BODY MASS INDEX: 21.4 KG/M2 | OXYGEN SATURATION: 100 % | DIASTOLIC BLOOD PRESSURE: 83 MMHG

## 2023-09-08 DIAGNOSIS — E03.9 HYPOTHYROIDISM, UNSPECIFIED TYPE: Primary | ICD-10-CM

## 2023-09-08 DIAGNOSIS — M54.2 NECK PAIN: ICD-10-CM

## 2023-09-08 DIAGNOSIS — Z78.0 POST-MENOPAUSAL: ICD-10-CM

## 2023-09-08 DIAGNOSIS — M25.562 ACUTE PAIN OF LEFT KNEE: ICD-10-CM

## 2023-09-08 NOTE — PROGRESS NOTES
"Chief Complaint  Knee Pain (Left , shooting pain, not constant ), Back Pain (Upper back, pulling sensation at the top of spine ), and Hypothyroidism    Subjective    History of Present Illness {CC  Problem List  Visit  Diagnosis   Encounters  Notes  Medications  Labs  Result Review Imaging  Media :23}     Toshia Kebede presents to [facility] for Knee Pain (Left , shooting pain, not constant ), Back Pain (Upper back, pulling sensation at the top of spine ), and Hypothyroidism.  History of Present Illness   She presents for follow up of hypothyroid. She is on synthroid 125mcg and feels well on current dose. She denies any change in mood or energy.    She also complain of recent flare of posterior neck pain. There was no recent injury. The pain resolved on its own after a few days. She is concerned about possible osteoporosis and has not had her bone density in several years    She also complains of intermittent left knee pain. She denies any recent or past injury. She has started running 3 days a week for exercise and with most recent run had pain after a couple of miles.     She is on sidenafil for raynauds and has done well with this.       Objective     Vital Signs:   /83   Pulse 52   Ht 172.7 cm (68\")   Wt 64 kg (141 lb 3.2 oz)   SpO2 100%   BMI 21.47 kg/m²   Body mass index is 21.47 kg/m².     Physical Exam  Constitutional:       General: She is not in acute distress.  Cardiovascular:      Rate and Rhythm: Normal rate and regular rhythm.      Heart sounds: No murmur heard.  Pulmonary:      Effort: No respiratory distress.      Breath sounds: Normal breath sounds.   Musculoskeletal:      Cervical back: No spasms or tenderness.      Lumbar back: No tenderness.      Right knee: No effusion. Normal range of motion.      Left knee: No effusion. Normal range of motion.   Neurological:      General: No focal deficit present.      Mental Status: She is alert.    "     Result Review  Data Reviewed:{ Labs  Result Review  Imaging  Med Tab  Media :23}                Assessment and Plan {CC Problem List  Visit Diagnosis  ROS  Review (Popup)  Health Maintenance  Quality  BestPractice  Medications  SmartSets  SnapShot Encounters  Media :23}   Diagnoses and all orders for this visit:    1. Hypothyroidism, unspecified type (Primary)  -     TSH Rfx On Abnormal To Free T4    2. Neck pain    3. Acute pain of left knee    4. Post-menopausal  -     DEXA Bone Density Axial; Future        Patient Instructions   Continue your current medications.   We will order bone density as it is due. However osteoporosis is generally not painful unless fracture has occurred.  I encourage regular stretching and strengthening exercises.  If neck pain recurs, you can take advil or aleeve as needed and would recommend xray and physical therapy.  If knee pain persists, may do best with fast walking as opposed to running.        Patient was given instructions and counseling regarding her condition or for health maintenance advice on the AVS.       Return in about 6 months (around 3/8/2024) for Annual.    Claudia Robbins MD

## 2023-09-08 NOTE — PATIENT INSTRUCTIONS
Continue your current medications.   We will order bone density as it is due. However osteoporosis is generally not painful unless fracture has occurred.  I encourage regular stretching and strengthening exercises.  If neck pain recurs, you can take advil or aleeve as needed and would recommend xray and physical therapy.  If knee pain persists, may do best with fast walking as opposed to running.

## 2023-09-09 LAB — TSH SERPL DL<=0.005 MIU/L-ACNC: 3.08 UIU/ML (ref 0.45–4.5)

## 2023-10-05 RX ORDER — ACEBUTOLOL HYDROCHLORIDE 200 MG/1
CAPSULE ORAL
Qty: 90 CAPSULE | Refills: 3 | Status: SHIPPED | OUTPATIENT
Start: 2023-10-05

## 2023-10-22 DIAGNOSIS — E03.9 HYPOTHYROIDISM, UNSPECIFIED TYPE: ICD-10-CM

## 2023-10-22 RX ORDER — LEVOTHYROXINE SODIUM 125 MCG
TABLET ORAL
Qty: 30 TABLET | Refills: 4 | Status: SHIPPED | OUTPATIENT
Start: 2023-10-22

## 2023-11-27 ENCOUNTER — TELEPHONE (OUTPATIENT)
Dept: FAMILY MEDICINE CLINIC | Facility: CLINIC | Age: 63
End: 2023-11-27
Payer: COMMERCIAL

## 2023-11-27 DIAGNOSIS — E03.9 HYPOTHYROIDISM, UNSPECIFIED TYPE: ICD-10-CM

## 2023-11-27 RX ORDER — LEVOTHYROXINE SODIUM 125 MCG
125 TABLET ORAL DAILY
Qty: 30 TABLET | Refills: 4 | Status: SHIPPED | OUTPATIENT
Start: 2023-11-27

## 2023-11-27 NOTE — TELEPHONE ENCOUNTER
"  Caller: Toshia Kebede    Relationship: Self    Best call back number: [phone]    What was the call regarding: PATIENT STATED THE PHARMACY IS TRYING TO SUBSTITUTE HER SYNTHROID MEDICATION WITH SOMETHING ELSE.    SHE WOULD LIKE TO REQUEST THAT \"DO NOT SUBSTITUTE\" BE PUT ON THIS MEDICATION.    SHE WOULD LIKE A CALL.  "

## 2024-01-10 ENCOUNTER — TRANSCRIBE ORDERS (OUTPATIENT)
Dept: ADMINISTRATIVE | Facility: HOSPITAL | Age: 64
End: 2024-01-10
Payer: COMMERCIAL

## 2024-01-10 DIAGNOSIS — Z12.31 BREAST CANCER SCREENING BY MAMMOGRAM: Primary | ICD-10-CM

## 2024-03-08 ENCOUNTER — HOSPITAL ENCOUNTER (OUTPATIENT)
Dept: MAMMOGRAPHY | Facility: HOSPITAL | Age: 64
Discharge: HOME | End: 2024-03-08
Admitting: FAMILY MEDICINE
Payer: COMMERCIAL

## 2024-03-08 DIAGNOSIS — Z12.31 BREAST CANCER SCREENING BY MAMMOGRAM: ICD-10-CM

## 2024-03-08 PROCEDURE — 77063 BREAST TOMOSYNTHESIS BI: CPT

## 2024-03-08 PROCEDURE — 77067 SCR MAMMO BI INCL CAD: CPT

## 2024-03-15 ENCOUNTER — OFFICE VISIT (OUTPATIENT)
Dept: FAMILY MEDICINE CLINIC | Facility: CLINIC | Age: 64
End: 2024-03-15
Payer: COMMERCIAL

## 2024-03-15 VITALS
OXYGEN SATURATION: 95 % | WEIGHT: 138.3 LBS | BODY MASS INDEX: 20.96 KG/M2 | DIASTOLIC BLOOD PRESSURE: 71 MMHG | SYSTOLIC BLOOD PRESSURE: 119 MMHG | HEIGHT: 68 IN | HEART RATE: 57 BPM

## 2024-03-15 DIAGNOSIS — G89.29 CHRONIC PAIN OF LEFT KNEE: ICD-10-CM

## 2024-03-15 DIAGNOSIS — Z13.220 LIPID SCREENING: ICD-10-CM

## 2024-03-15 DIAGNOSIS — Z78.0 POST-MENOPAUSAL: ICD-10-CM

## 2024-03-15 DIAGNOSIS — Z00.00 WELL ADULT EXAM: Primary | ICD-10-CM

## 2024-03-15 DIAGNOSIS — Z11.59 NEED FOR HEPATITIS C SCREENING TEST: ICD-10-CM

## 2024-03-15 DIAGNOSIS — M25.562 CHRONIC PAIN OF LEFT KNEE: ICD-10-CM

## 2024-03-15 DIAGNOSIS — E03.9 HYPOTHYROIDISM, UNSPECIFIED TYPE: ICD-10-CM

## 2024-03-15 PROBLEM — I77.3: Status: ACTIVE | Noted: 2019-10-15

## 2024-03-15 NOTE — PROGRESS NOTES
Chief Complaint  Annual Exam    Subjective    History of Present Illness {CC  Problem List  Visit  Diagnosis   Encounters  Notes  Medications  Labs  Result Review Imaging  Media :23}     Toshia Kebede presents to [facility] for Annual Exam.  She is  and has 2 adult children. She helps to care for her grandchildren 6 days a week. She works as  for [employer]. Her last mammogram was 3/2024. She previously saw gynecologist and her pap smears were always normal. Her last colon cancer screening was cologard 9/2022 and was normal. She has never been a smoker.  She exercises on average once a week but hopes to increase with longer sunlight.     She is also here for follow up of hypothyroidism. She is currently on synthroid 125 mcg daily. She overall is doing well on current dose.    She has been seeing gynecologist for hormone replacement therapy. She is currently on  activella. She initially started for increased sweating but isn't sure if is currently helping or not. Her current gynecologist had mentioned it is also helpful for bladder health but she is retiring  so she will no longer be seeing her.     She also presents to discuss concerns about her risk for future dementia. She reports that her mom has been diagnosed with dementia and has been started on medication and since starting medication has not declined further. She also has a history of vascular dementia on her father's side. She reports it takes longer for recall in the past.    She also complains of increased problems with urgency and mild stress incontinence. She reports mild intermittent leakage with cough or sneeze and will wear a pad daily just in case.     She complains of left knee pain. She is no  longer able to run or do high impact exercise due to pain.  She continues to walk for exercise. She does not take medication for pain.     She also complains of increased stress. She helps to  "care for 2 young grandchildren during the week.     History of Present Illness     Social History     Socioeconomic History    Marital status:    Tobacco Use    Smoking status: Never    Smokeless tobacco: Never   Vaping Use    Vaping status: Never Used   Substance and Sexual Activity    Alcohol use: Yes     Alcohol/week: 6.0 standard drinks of alcohol     Types: 6 Glasses of wine per week     Comment: occasionally    Drug use: No    Sexual activity: Yes     Partners: Male     Birth control/protection: Post-menopausal, None     Comment:  only        Review of Systems   Constitutional:  Negative for fatigue.   HENT:  Negative for ear pain and sore throat.    Eyes:  Negative for pain and visual disturbance.   Respiratory:  Negative for cough and shortness of breath.    Cardiovascular:  Negative for chest pain and palpitations.   Gastrointestinal:  Negative for abdominal pain, constipation and diarrhea.   Musculoskeletal:         Left knee pain, mild   Skin:  Negative for color change.   Allergic/Immunologic: Negative for environmental allergies.   Neurological:  Negative for dizziness and headaches.   Psychiatric/Behavioral:  Negative for dysphoric mood.         Objective       Vital Signs:   /71   Pulse 57   Ht 172.7 cm (68\")   Wt 62.7 kg (138 lb 4.8 oz)   SpO2 95%   BMI 21.03 kg/m²     Body mass index is 21.03 kg/m².     PHQ-9 Depression Screening  Little interest or pleasure in doing things? 0-->not at all   Feeling down, depressed, or hopeless? 1-->several days   Trouble falling or staying asleep, or sleeping too much?     Feeling tired or having little energy?     Poor appetite or overeating?     Feeling bad about yourself - or that you are a failure or have let yourself or your family down?     Trouble concentrating on things, such as reading the newspaper or watching television?     Moving or speaking so slowly that other people could have noticed? Or the opposite - being so fidgety or " restless that you have been moving around a lot more than usual?     Thoughts that you would be better off dead, or of hurting yourself in some way?     PHQ-9 Total Score 1   If you checked off any problems, how difficult have these problems made it for you to do your work, take care of things at home, or get along with other people?           Physical Exam  Constitutional:       General: She is not in acute distress.  HENT:      Head: Normocephalic and atraumatic.      Nose: No rhinorrhea.      Mouth/Throat:      Mouth: Mucous membranes are moist.   Eyes:      Extraocular Movements: Extraocular movements intact.      Conjunctiva/sclera: Conjunctivae normal.   Cardiovascular:      Rate and Rhythm: Normal rate and regular rhythm.      Pulses: Normal pulses.      Heart sounds: No murmur heard.  Pulmonary:      Effort: No respiratory distress.      Breath sounds: Normal breath sounds.   Abdominal:      General: There is no distension.      Palpations: Abdomen is soft.      Tenderness: There is no abdominal tenderness. There is no guarding.   Musculoskeletal:      Right lower leg: No edema.      Left lower leg: No edema.   Lymphadenopathy:      Cervical: No cervical adenopathy.   Skin:     General: Skin is warm.      Findings: No lesion.   Neurological:      General: No focal deficit present.      Mental Status: She is alert.   Psychiatric:         Behavior: Behavior normal.          Result Review  Data Reviewed:{ Labs  Result Review  Imaging  Med Tab  Media :23}                Assessment and Plan {CC Problem List  Visit Diagnosis  ROS  Review (Popup)  Health Maintenance  Quality  BestPractice  Medications  SmartSets  SnapShot Encounters  Media :23}   Diagnoses and all orders for this visit:    1. Well adult exam (Primary)  -     CBC & Differential; Future  -     Comprehensive Metabolic Panel; Future  -     Lipid Panel; Future  -     TSH Rfx On Abnormal To Free T4; Future  -     Vitamin B12; Future    2.  Hypothyroidism, unspecified type  -     TSH Rfx On Abnormal To Free T4; Future    3. Post-menopausal  Comments:  Trial off HRT.  If increased symptom, see gyn for lowest dose and duration of HRT to control symptoms.  If does well, plan topical estrogen for local symptoms.    4. Lipid screening  -     Vitamin B12; Future    5. Need for hepatitis C screening test  -     Hepatitis C Antibody; Future    6. Chronic pain of left knee  Comments:  Can use over the counter tylenol or advil as needed.  If worsens, plan xray and possible ortho evaluation.        Patient Instructions   Continue your current medications.   Aim for 150 minutes of aerobic exercise weekly.  You had lab tests today. You should receive a call or my chart message with your test results. If you have not received your results in the next 7-10 days, please contact the office.    Continue yearly mammogram.  Next colon cancer screening.  due 9/2025     Routine health maintenance, immunizations, and cancer screenings were discussed and encouraged.     Patient was given instructions and counseling regarding her condition or for health maintenance advice on the AVS.       No follow-ups on file.    Claudia Robbins MD

## 2024-03-18 NOTE — PATIENT INSTRUCTIONS
Continue your current medications.   Aim for 150 minutes of aerobic exercise weekly.  You had lab tests today. You should receive a call or my chart message with your test results. If you have not received your results in the next 7-10 days, please contact the office.    Continue yearly mammogram.  Next colon cancer screening.  due 9/2025

## 2024-03-21 ENCOUNTER — HOSPITAL ENCOUNTER (OUTPATIENT)
Dept: BONE DENSITY | Facility: HOSPITAL | Age: 64
Discharge: HOME | End: 2024-03-21
Admitting: FAMILY MEDICINE
Payer: COMMERCIAL

## 2024-03-21 DIAGNOSIS — Z78.0 POST-MENOPAUSAL: ICD-10-CM

## 2024-03-21 PROCEDURE — 77080 DXA BONE DENSITY AXIAL: CPT

## 2024-03-22 PROBLEM — M85.851 OSTEOPENIA OF BOTH HIPS: Status: ACTIVE | Noted: 2024-03-22

## 2024-03-22 PROBLEM — M85.852 OSTEOPENIA OF BOTH HIPS: Status: ACTIVE | Noted: 2024-03-22

## 2024-03-22 NOTE — PROGRESS NOTES
Hello,    Your DEXA scan showed osteopenia, or decreased bone density. I encourage a diet with calcium and vitamin D in it (1000 mg of calcium total of diet and supplement, and 600 international units of vitamin D daily are generally suggested), and regular low-impact cardio and strength training to maintain bone health. Repeat in 2 years.     For more info, check out: [email or web address]    Thank you!    Dr. Hodges

## 2024-08-09 ENCOUNTER — OFFICE VISIT (OUTPATIENT)
Age: 64
End: 2024-08-09
Payer: COMMERCIAL

## 2024-08-09 VITALS
DIASTOLIC BLOOD PRESSURE: 82 MMHG | SYSTOLIC BLOOD PRESSURE: 120 MMHG | HEART RATE: 63 BPM | HEIGHT: 68 IN | WEIGHT: 140 LBS | BODY MASS INDEX: 21.22 KG/M2

## 2024-08-09 DIAGNOSIS — I49.3 PVC'S (PREMATURE VENTRICULAR CONTRACTIONS): Primary | ICD-10-CM

## 2024-08-09 DIAGNOSIS — I77.3: ICD-10-CM

## 2024-08-09 RX ORDER — ACEBUTOLOL HYDROCHLORIDE 200 MG/1
200 CAPSULE ORAL DAILY
Qty: 90 CAPSULE | Refills: 3 | Status: SHIPPED | OUTPATIENT
Start: 2024-08-09

## 2024-08-09 NOTE — PROGRESS NOTES
"      ELECTROPHYSIOLOGY   Date of Office Visit: 2024  Patient Name: Toshia Kebede  : 1960  Encounter Provider: Hernán Blood PA-C    Electrophysiologist: Dr. Christianson  CHIEF COMPLAINT / REASON FOR OFFICE VISIT     PVCs  1 year follow up     HISTORY OF PRESENT ILLNESS     This is a 63 y.o. year old female who presents to [facility] for a 1 year follow up of cardiovascular health.     She has a history of PVCs and is currently treated with a sectral 200 mg daily.    Today the patient reports when she misses a dose of her beta-blocker she will have breakthrough palpitations.  Is doing a great job controlling her symptoms and really she has no complaints today.    Her gynecologist retired and she is looking to get back on hormone replacement therapy because it really helps her feel a lot better.  I recommended that she find a new gynecologist that does this treatment.  From a cardiology standpoint it is safe to be on hormone replacement therapy with current guidelines.    She denies any episodes of dizziness, lightheadedness or near syncope.  Energy levels are stable compared to last year.      Walking 3 miles regularly.  She used to run but is now having issues with her knees.    She follows with vascular for carotid disease of her FMD, both less than 50% stenosis.     PMHx: PVCs, Raynaud's syndrome, FMD of the carotids, Sjogren's disease    PHYSICAL EXAMINATION     Vital Signs:  /82   Pulse 63   Ht 172.7 cm (68\")   Wt 63.5 kg (140 lb)   BMI 21.29 kg/m²   Estimated body mass index is 21.29 kg/m² as calculated from the following:    Height as of this encounter: 172.7 cm (68\").    Weight as of this encounter: 63.5 kg (140 lb).       BMI is within normal parameters. No other follow-up for BMI required.      Physical Exam  Constitutional:       Appearance: Normal appearance.   HENT:      Head: Normocephalic and atraumatic.   Cardiovascular:      Rate and Rhythm: " "Normal rate and regular rhythm.      Pulses: Normal pulses.      Heart sounds: Normal heart sounds.   Pulmonary:      Effort: Pulmonary effort is normal.      Breath sounds: Normal breath sounds.   Musculoskeletal:      Right lower leg: No edema.      Left lower leg: No edema.   Skin:     General: Skin is warm and dry.   Neurological:      General: No focal deficit present.      Mental Status: She is alert and oriented to person, place, and time.          Cardiac Testing/Results     Result Review :  The following data was reviewed by: Hernán Blood PA-C on 08/09/2024:    Lipid Panel          3/18/2024    09:07   Lipid Panel   Total Cholesterol 151    Triglycerides 80    HDL Cholesterol 71    VLDL Cholesterol 15    LDL Cholesterol  65       Lab Results   Component Value Date     03/18/2024    K 4.5 03/18/2024     03/18/2024    CO2 23 03/18/2024    BUN 15 03/18/2024    CREATININE 0.80 03/18/2024    GLUCOSE 88 03/18/2024    CALCIUM 9.6 03/18/2024    ALBUMIN 4.2 03/18/2024    AST 17 03/18/2024    ALT 10 03/18/2024     Lab Results   Component Value Date    WBC 4.0 03/18/2024    HGB 13.9 03/18/2024    HCT 40.5 03/18/2024    MCV 99 (H) 03/18/2024     03/18/2024     No results found for: \"PROBNP\", \"BNP\"  No results found for: \"CKTOTAL\", \"CKMB\", \"CKMBINDEX\", \"TROPONINI\", \"TROPONINT\"  Lab Results   Component Value Date    TSH 1.710 03/18/2024    TSH 3.080 09/08/2023                 ECG 12 Lead    Date/Time: 8/9/2024 9:34 AM  Performed by: Hrenán Blood PA-C    Authorized by: Hernán Blood PA-C  Rhythm: sinus rhythm  Rate: normal  BPM: 63  QRS axis: normal    Clinical impression: normal ECG  Comments: IVCD, qtc 415                ASSESSMENT & PLAN       Diagnoses and all orders for this visit:    1. PVC's (premature ventricular contractions) (Primary)  Symptoms are very well-controlled on her current beta-blocker.  I sent in a refill.  If she starts having breakthrough symptoms she " will contact office.  Recent thyroid function was normal    2. Bilateral carotid artery fibromuscular hyperplasia  She has fibromuscular dysplasia in her bilateral carotids.  She takes aspirin for this.  She has no new complaints and will get a Doppler completed either this year or next year.  Vascular follows this.    Overall she has been doing well and she continues to follow-up with her primary for blood pressure management/management of her  Raynaud's      Follow Up:  Return in about 1 year (around 8/9/2025) for Dr. Mane Packer.  Patient was given instructions and counseling regarding her condition or for health maintenance advice. Please contact office if worsening symptoms or proceed to ER when appropriate.      Hernán Blood PA-C  08/09/24  09:34 EDT    MEDICATIONS         Discharge Medications            Accurate as of August 9, 2024  9:34 AM. If you have any questions, ask your nurse or doctor.                Continue These Medications        Instructions Start Date   acebutolol 200 MG capsule  Commonly known as: SECTRAL   200 mg, Oral, Daily      aspirin 81 MG EC tablet   81 mg, Oral, Daily      calcium carbonate-cholecalciferol 500-400 MG-UNIT tablet tablet   Oral, Daily      cholecalciferol 25 MCG (1000 UT) tablet  Commonly known as: VITAMIN D3   1,000 Units, Oral, Daily      multivitamin tablet tablet  Commonly known as: THERAGRAN   Oral      sildenafil 20 MG tablet  Commonly known as: REVATIO   1 tablet, Oral, Daily      spironolactone 100 MG tablet  Commonly known as: ALDACTONE   100 mg, Oral, Daily      Synthroid 125 MCG tablet  Generic drug: levothyroxine   125 mcg, Oral, Daily      VITAMIN B 12 PO   Oral, Daily                   **Dragon Disclaimer: This note was dictated using an electronic transcription. The electronic translation of spoken language may permit erroneous, or at times, nonsensical words or phrases to be inadvertently transcribed. Although I have reviewed the note for  such errors, some may still exist.

## 2024-08-24 DIAGNOSIS — E03.9 HYPOTHYROIDISM, UNSPECIFIED TYPE: ICD-10-CM

## 2024-08-26 RX ORDER — LEVOTHYROXINE SODIUM 125 MCG
125 TABLET ORAL DAILY
Qty: 30 TABLET | Refills: 4 | Status: SHIPPED | OUTPATIENT
Start: 2024-08-26

## 2024-09-28 DIAGNOSIS — I49.3 PVC'S (PREMATURE VENTRICULAR CONTRACTIONS): ICD-10-CM

## 2024-09-30 RX ORDER — ACEBUTOLOL HYDROCHLORIDE 200 MG/1
200 CAPSULE ORAL DAILY
Qty: 90 CAPSULE | Refills: 3 | OUTPATIENT
Start: 2024-09-30

## 2024-11-12 NOTE — PROGRESS NOTES
Date of Office Visit: 2021  Encounter Provider: Ethan Christianson MD  Place of Service: [facility]ARDI[facility]  Patient Name: Toshia Kebede  : 1960    Subjective:     Encounter Date:2021      Patient ID: Toshia Kebede is a 60 y.o. female who has a cc of  PVCs and palp and is here for fu.     She has had a lot of stress. She was taking care of her uncle who had lymphoma.     Her BB really controls her palp.     The patient had a good year.   No anginal chest pain,   No sig quintana,   No soa,   No fainting,  No orthostasis.   No edema.   Exercise tolerance: good. Runs one day per week. 3 miles.     There have been no hospital admission since the last visit.     There have been no bleeding events.       Past Medical History:   Diagnosis Date   • Acne    • Breast cyst    • Disease of thyroid gland    • Fibromuscular dysplasia (CMS/HCC)    • Heart palpitations    • Hypertension    • Hypothyroidism    • PVC's (premature ventricular contractions)    • Raynaud's disease    • Sjogren's syndrome (CMS/HCC)    • URI (upper respiratory infection)        Social History     Socioeconomic History   • Marital status:      Spouse name: Not on file   • Number of children: Not on file   • Years of education: Not on file   • Highest education level: Not on file   Tobacco Use   • Smoking status: Never Smoker   • Smokeless tobacco: Never Used   Vaping Use   • Vaping Use: Never used   Substance and Sexual Activity   • Alcohol use: Yes     Comment: occasionally   • Drug use: No   • Sexual activity: Defer     Birth control/protection: None       Family History   Problem Relation Age of Onset   • Hyperlipidemia Mother    • Osteoporosis Mother    • Aneurysm Father         cerebral artery   • Diabetes Father    • Breast cancer Maternal Aunt    • Heart attack Maternal Grandfather        Review of Systems   Constitutional: Negative for fever and night sweats.   HENT: Negative for ear pain and stridor.   "  Eyes: Negative for discharge and visual halos.   Cardiovascular: Negative for cyanosis.   Respiratory: Negative for hemoptysis and sputum production.    Hematologic/Lymphatic: Negative for adenopathy.   Skin: Negative for nail changes and unusual hair distribution.   Musculoskeletal: Negative for gout and joint swelling.   Gastrointestinal: Negative for bowel incontinence and flatus.   Genitourinary: Negative for dysuria and flank pain.   Neurological: Negative for seizures and tremors.   Psychiatric/Behavioral: Negative for altered mental status. The patient is not nervous/anxious.             Objective:     Vitals:    06/14/21 1142   BP: 140/84   Pulse: (!) 48   Weight: 66.2 kg (146 lb)   Height: 172.7 cm (68\")         Eyes:      General:         Right eye: No discharge.         Left eye: No discharge.   HENT:      Head: Normocephalic and atraumatic.   Neck:      Thyroid: No thyromegaly.      Vascular: No JVD.   Pulmonary:      Effort: Pulmonary effort is normal.      Breath sounds: Normal breath sounds. No rales.   Cardiovascular:      Normal rate. Regular rhythm.      No gallop.   Edema:     Peripheral edema absent.   Abdominal:      General: Bowel sounds are normal.      Palpations: Abdomen is soft.      Tenderness: There is no abdominal tenderness.   Musculoskeletal: Normal range of motion.         General: No deformity. Skin:     General: Skin is warm and dry.      Findings: No erythema.   Neurological:      Mental Status: Alert and oriented to person, place, and time.      Motor: Normal muscle tone.   Psychiatric:         Behavior: Behavior normal.         Thought Content: Thought content normal.           ECG 12 Lead    Date/Time: 6/14/2021 11:52 AM  Performed by: Ethan Christianson MD  Authorized by: Ethan Christianson MD   Comparison: compared with previous ECG   Similar to previous ECG  Rhythm: sinus rhythm  Rate: normal  Conduction: conduction normal  ST Segments: ST segments normal  T Waves: T waves " normal  QRS axis: normal    Clinical impression: normal ECG            Lab Review:       Assessment:          Diagnosis Plan   1. PVC's (premature ventricular contractions)     2. Heart palpitations            Plan:     Palp -- controlled on BB    FMD -- on ASA and follows with Bob Sow              1.2

## 2025-01-19 DIAGNOSIS — E03.9 HYPOTHYROIDISM, UNSPECIFIED TYPE: ICD-10-CM

## 2025-01-20 RX ORDER — LEVOTHYROXINE SODIUM 125 MCG
125 TABLET ORAL DAILY
Qty: 30 TABLET | Refills: 4 | Status: SHIPPED | OUTPATIENT
Start: 2025-01-20

## 2025-01-27 ENCOUNTER — OFFICE VISIT (OUTPATIENT)
Dept: FAMILY MEDICINE CLINIC | Facility: CLINIC | Age: 65
End: 2025-01-27
Payer: COMMERCIAL

## 2025-01-27 VITALS
RESPIRATION RATE: 17 BRPM | HEIGHT: 68 IN | BODY MASS INDEX: 21.4 KG/M2 | OXYGEN SATURATION: 98 % | SYSTOLIC BLOOD PRESSURE: 120 MMHG | DIASTOLIC BLOOD PRESSURE: 72 MMHG | WEIGHT: 141.2 LBS | HEART RATE: 52 BPM

## 2025-01-27 DIAGNOSIS — R30.0 DYSURIA: Primary | ICD-10-CM

## 2025-01-27 DIAGNOSIS — E03.9 HYPOTHYROIDISM, UNSPECIFIED TYPE: ICD-10-CM

## 2025-01-27 DIAGNOSIS — F43.9 STRESS: ICD-10-CM

## 2025-01-27 PROBLEM — I83.90 ASYMPTOMATIC RETICULAR VEINS 1 MM TO 3 MM IN DIAMETER: Status: ACTIVE | Noted: 2024-09-12

## 2025-01-27 LAB
BILIRUB BLD-MCNC: NEGATIVE MG/DL
CLARITY, POC: CLEAR
COLOR UR: YELLOW
EXPIRATION DATE: ABNORMAL
GLUCOSE UR STRIP-MCNC: NEGATIVE MG/DL
KETONES UR QL: NEGATIVE
LEUKOCYTE EST, POC: NEGATIVE
Lab: ABNORMAL
NITRITE UR-MCNC: NEGATIVE MG/ML
PH UR: 7.5 [PH] (ref 5–8)
PROT UR STRIP-MCNC: NEGATIVE MG/DL
RBC # UR STRIP: ABNORMAL /UL
SP GR UR: 1.01 (ref 1–1.03)
UROBILINOGEN UR QL: ABNORMAL

## 2025-01-27 PROCEDURE — 81003 URINALYSIS AUTO W/O SCOPE: CPT | Performed by: FAMILY MEDICINE

## 2025-01-27 PROCEDURE — 99214 OFFICE O/P EST MOD 30 MIN: CPT | Performed by: FAMILY MEDICINE

## 2025-01-27 RX ORDER — ESCITALOPRAM OXALATE 5 MG/1
5 TABLET ORAL DAILY
Qty: 60 TABLET | Refills: 0 | Status: SHIPPED | OUTPATIENT
Start: 2025-01-27

## 2025-01-27 RX ORDER — ESTRADIOL 0.05 MG/D
1 PATCH, EXTENDED RELEASE TRANSDERMAL WEEKLY
COMMUNITY

## 2025-01-27 RX ORDER — NITROFURANTOIN 25; 75 MG/1; MG/1
100 CAPSULE ORAL 2 TIMES DAILY
Qty: 10 CAPSULE | Refills: 0 | Status: SHIPPED | OUTPATIENT
Start: 2025-01-27 | End: 2025-02-01

## 2025-01-27 RX ORDER — BUSPIRONE HYDROCHLORIDE 10 MG/1
TABLET ORAL
Qty: 60 TABLET | Refills: 1 | Status: SHIPPED | OUTPATIENT
Start: 2025-01-27

## 2025-01-27 RX ORDER — PROGESTERONE 200 MG/1
1 CAPSULE ORAL DAILY
COMMUNITY
Start: 2024-08-26

## 2025-01-27 NOTE — PROGRESS NOTES
Chief Complaint  Urinary Tract Infection    Subjective        Toshia Kebede presents to [facility]       The patient is a 64-year-old female who presents for concerns of a urinary tract infection (UTI) and increased stress.    She reports experiencing frequent urination accompanied by mild discomfort. She describes the discomfort as not severe, unlike previous episodes where she felt a cutting sensation. She has been self-medicating with over-the-counter medication, which has provided some relief. Her last dose was at 9:00 PM the previous night, and she notes that her urine color has returned to its usual yellow hue. She is not experiencing any abdominal or flank pain, fevers, chills, nausea, or vomiting.    She is currently under significant stress due to her 's recent diagnosis of prostate cancer. She expresses concern about his upcoming radiation therapy, which is scheduled to start on 03/01/2025. She also mentions that her  is scheduled for knee surgery on 02/05/2025. She does not have a robust support system in place, although she can confide in her  and a few close friends. She is considering counseling as a potential coping mechanism. She continues to work full-time and provides care for her grandchildren for at least 40 hours per week. She has not yet considered pharmacological intervention for her anxiety. She recalls an incident where her blood pressure spiked during a particularly stressful episode involving her son and daughter-in-law. She is currently on Sectral for PVCs, which she believes may also lower her blood pressure. She has been on this medication for approximately 8 years and is contemplating whether she should continue its use.    FAMILY HISTORY  Her son has a mental health diagnosis of bipolar disorder. Her mother has dementia.    ALLERGIES  She has an intolerance to AMLODIPINE.    MEDICATIONS  Current: Spironolactone, Synthroid,  "Sectral    IMMUNIZATIONS  She has received all recommended vaccinations except for the shingles vaccine.    History of Present Illness    Objective   Vital Signs:  /72   Pulse 52   Resp 17   Ht 172.7 cm (68\")   Wt 64 kg (141 lb 3.2 oz)   SpO2 98%   BMI 21.47 kg/m²   Estimated body mass index is 21.47 kg/m² as calculated from the following:    Height as of this encounter: 172.7 cm (68\").    Weight as of this encounter: 64 kg (141 lb 3.2 oz).    BMI is within normal parameters. No other follow-up for BMI required.      Physical Exam   Result Review :               Results  Laboratory Studies  Urinalysis shows presence of blood.       Assessment and Plan        1. Urinary Tract Infection (UTI).  The clinical presentation, including urinary frequency, urgency, and mild discomfort, aligns with a UTI diagnosis. There is no reported abdominal pain, flank pain, fever, chills, nausea, or vomiting. A urine culture will be conducted for further analysis. Regardless of the culture results, a 5-day course of Macrobid has been prescribed. If necessary, adjustments to the antibiotic regimen will be made based on the culture findings.     2. Anxiety.  The anxiety appears to be situational, likely triggered by her 's recent prostate cancer diagnosis and other family stressors. Various treatment options were discussed, including SSRIs, BuSpar, and propranolol. Given her inability to predict anxiety-inducing situations, a combination of BuSpar and an SSRI was deemed appropriate. The potential side effects of these medications were thoroughly explained. A prescription for Lexapro 5 mg daily was provided, with the option to increase the dosage after 2 weeks. Additionally, a prescription for BuSpar 10 mg tablets was given, with instructions to take half a tablet twice daily as needed for anxiety. She was advised to monitor her response to BuSpar and adjust the dosage to a full tablet twice daily as needed. "     Follow-up  The patient will follow up in 4 weeks.    Diagnoses and all orders for this visit:    1. Dysuria (Primary)  -     POC Urinalysis Dipstick, Automated  -     Urine Culture - Urine, Urine, Clean Catch    2. Stress  -     Ambulatory Referral to Behavioral Health    3. Hypothyroidism, unspecified type    Other orders  -     nitrofurantoin, macrocrystal-monohydrate, (Macrobid) 100 MG capsule; Take 1 capsule by mouth 2 (Two) Times a Day for 5 days.  Dispense: 10 capsule; Refill: 0  -     escitalopram (Lexapro) 5 MG tablet; Take 1 tablet by mouth Daily.  Dispense: 60 tablet; Refill: 0  -     busPIRone (BUSPAR) 10 MG tablet; Take 1/2 tablet by mouth daily for 3 days and then bid for three days and then advance to 1 po bid if needed.  Dispense: 60 tablet; Refill: 1             Follow Up   Return in about 4 weeks (around 2/24/2025).  Patient was given instructions and counseling regarding her condition or for health maintenance advice. Please see specific information pulled into the AVS if appropriate.     Patient or patient representative verbalized consent for the use of Ambient Listening during the visit with  Maryse Ruiz MD for chart documentation. 1/27/2025  11:46 EST

## 2025-01-29 LAB
BACTERIA UR CULT: NORMAL
BACTERIA UR CULT: NORMAL

## 2025-02-05 ENCOUNTER — TELEPHONE (OUTPATIENT)
Dept: FAMILY MEDICINE CLINIC | Facility: CLINIC | Age: 65
End: 2025-02-05
Payer: COMMERCIAL

## 2025-02-05 NOTE — TELEPHONE ENCOUNTER
Caller: Toshia Kebede    Relationship: Self    Best call back number: [phone]     Who are you requesting to speak with (clinical staff, provider,  specific staff member): CLINICAL STAFF    What was the call regarding: PATIENT STATES THAT SHE SAW DR SPENCER ON 1/27/25 FOR A UTI. SHE HAS FINISHED MEDICATION, AND NOTICED TODAY THAT THERE WAS SOME DRIED BLOOD DISCHARGE. REQUESTS A CALL BACK TO DISCUSS IF SHE SHOULD SCHEDULE FOLLOW UP WITH PCP OR GO TO OB/GYN

## 2025-02-24 ENCOUNTER — OFFICE VISIT (OUTPATIENT)
Dept: FAMILY MEDICINE CLINIC | Facility: CLINIC | Age: 65
End: 2025-02-24
Payer: COMMERCIAL

## 2025-02-24 VITALS
HEIGHT: 68 IN | WEIGHT: 143.2 LBS | BODY MASS INDEX: 21.7 KG/M2 | DIASTOLIC BLOOD PRESSURE: 79 MMHG | OXYGEN SATURATION: 98 % | HEART RATE: 68 BPM | RESPIRATION RATE: 18 BRPM | SYSTOLIC BLOOD PRESSURE: 138 MMHG

## 2025-02-24 DIAGNOSIS — F41.9 ANXIETY: ICD-10-CM

## 2025-02-24 DIAGNOSIS — R30.0 DYSURIA: Primary | ICD-10-CM

## 2025-02-24 DIAGNOSIS — Z78.0 POST-MENOPAUSAL: ICD-10-CM

## 2025-02-24 DIAGNOSIS — N95.2 ATROPHIC VAGINITIS: ICD-10-CM

## 2025-02-24 LAB
BILIRUB BLD-MCNC: NEGATIVE MG/DL
CLARITY, POC: CLEAR
COLOR UR: YELLOW
EXPIRATION DATE: ABNORMAL
GLUCOSE UR STRIP-MCNC: NEGATIVE MG/DL
KETONES UR QL: NEGATIVE
LEUKOCYTE EST, POC: ABNORMAL
Lab: ABNORMAL
NITRITE UR-MCNC: NEGATIVE MG/ML
PH UR: 6 [PH] (ref 5–8)
PROT UR STRIP-MCNC: NEGATIVE MG/DL
RBC # UR STRIP: ABNORMAL /UL
SP GR UR: 1.01 (ref 1–1.03)
UROBILINOGEN UR QL: ABNORMAL

## 2025-02-24 PROCEDURE — 81003 URINALYSIS AUTO W/O SCOPE: CPT | Performed by: FAMILY MEDICINE

## 2025-02-24 PROCEDURE — 99214 OFFICE O/P EST MOD 30 MIN: CPT | Performed by: FAMILY MEDICINE

## 2025-02-24 NOTE — PROGRESS NOTES
"Chief Complaint  Dysuria    Subjective    History of Present Illness {CC  Problem List  Visit  Diagnosis   Encounters  Notes  Medications  Labs  Result Review Imaging  Media :23}     Toshia Kebede presents to [facility] for Dysuria.  History of Present Illness   She presents  for follow up of dysuria.  She was initially seen here and was treated for presumed UTI. Culture was negative. She then saw gynecologist and was diagnosed with atrophic vaginitis. She has been on estradiol patch and progesterone nightly for years. Vaginal estrogen was added but she had increased burning and bleeding with the medication. Her gynecologist recommended vaseline and she is improving.     She is also here for follow up of anxiety. Her anxietty worsened after  was diagnosed with prostate cancer. She was started on lexapro 5mg daily and buspar 5mg (1/2 of 10mg) twice daily at last visit 4 weeks ago. She reports anxiety has lessened and she overall feels well on current dosing. She denies any hopelessness or suicidal ideation.       Objective     Vital Signs:   /79   Pulse 68   Resp 18   Ht 172.7 cm (68\")   Wt 65 kg (143 lb 3.2 oz)   SpO2 98%   BMI 21.77 kg/m²   Body mass index is 21.77 kg/m².     Physical Exam  Constitutional:       General: She is not in acute distress.  Cardiovascular:      Rate and Rhythm: Normal rate and regular rhythm.      Heart sounds: No murmur heard.  Pulmonary:      Effort: No respiratory distress.      Breath sounds: Normal breath sounds.   Neurological:      General: No focal deficit present.      Mental Status: She is alert.   Psychiatric:         Behavior: Behavior normal.          Result Review  Data Reviewed:{ Labs  Result Review  Imaging  Med Tab  Media :23}                Assessment and Plan {CC Problem List  Visit Diagnosis  ROS  Review (Popup)  Health Maintenance  Quality  BestPractice  Medications  SmartSets  SnapShot " Encounters  Media :23}   Diagnoses and all orders for this visit:    1. Dysuria (Primary)  -     POC Urinalysis Dipstick, Automated  -     Urine Culture - Urine, Urine, Clean Catch    2. Atrophic vaginitis    3. Post-menopausal    4. Anxiety        Patient Instructions   Continue lexapro daily routinely and buspar twice daily as needed for anxiety.  Continue vaseline topically given adverse reaction to vaginal estrogen.         Patient was given instructions and counseling regarding her condition or for health maintenance advice on the AVS.       Return for Next scheduled follow up.    Claudia Robbins MD

## 2025-02-24 NOTE — PATIENT INSTRUCTIONS
Continue lexapro daily routinely and buspar twice daily as needed for anxiety.  Continue vaseline topically given adverse reaction to vaginal estrogen.

## 2025-02-26 LAB
BACTERIA UR CULT: NORMAL
BACTERIA UR CULT: NORMAL

## 2025-03-24 RX ORDER — ESCITALOPRAM OXALATE 5 MG/1
5 TABLET ORAL DAILY
Qty: 60 TABLET | Refills: 0 | Status: SHIPPED | OUTPATIENT
Start: 2025-03-24

## 2025-03-24 NOTE — TELEPHONE ENCOUNTER
Rx Refill Note  Requested Prescriptions     Pending Prescriptions Disp Refills    escitalopram (LEXAPRO) 5 MG tablet [Pharmacy Med Name: ESCITALOPRAM 5MG TABLETS] 60 tablet 0     Sig: TAKE 1 TABLET BY MOUTH DAILY      Last office visit with prescribing clinician: 1/27/2025   Last telemedicine visit with prescribing clinician: Visit date not found   Next office visit with prescribing clinician: Visit date not found       Richard Prasad  03/24/25, 08:05 EDT

## 2025-03-27 ENCOUNTER — OFFICE VISIT (OUTPATIENT)
Dept: FAMILY MEDICINE CLINIC | Facility: CLINIC | Age: 65
End: 2025-03-27
Payer: COMMERCIAL

## 2025-03-27 VITALS
DIASTOLIC BLOOD PRESSURE: 78 MMHG | HEIGHT: 68 IN | BODY MASS INDEX: 21.41 KG/M2 | HEART RATE: 51 BPM | WEIGHT: 141.3 LBS | SYSTOLIC BLOOD PRESSURE: 123 MMHG | OXYGEN SATURATION: 98 %

## 2025-03-27 DIAGNOSIS — Z78.0 POST-MENOPAUSAL: ICD-10-CM

## 2025-03-27 DIAGNOSIS — Z12.11 SCREENING FOR COLON CANCER: ICD-10-CM

## 2025-03-27 DIAGNOSIS — Z00.00 WELL ADULT EXAM: Primary | ICD-10-CM

## 2025-03-27 DIAGNOSIS — E03.9 HYPOTHYROIDISM, UNSPECIFIED TYPE: ICD-10-CM

## 2025-03-27 DIAGNOSIS — F41.9 ANXIETY: ICD-10-CM

## 2025-03-27 RX ORDER — PROGESTERONE 100 MG/1
100 CAPSULE ORAL
COMMUNITY
Start: 2025-03-10

## 2025-03-27 NOTE — PATIENT INSTRUCTIONS
Continue your current medications.   Aim for 150 minutes of aerobic exercise weekly as tolerated.  Continue yearly mammogram and routine follow up with gynecologist.  Colon cancer screening due in September. Order placed.  You had lab tests today. You should receive a call or my chart message with your test results. If you have not received your results in the next 7-10 days, please contact the office.

## 2025-03-27 NOTE — PROGRESS NOTES
Chief Complaint  Annual Exam    Subjective    History of Present Illness {CC  Problem List  Visit  Diagnosis   Encounters  Notes  Medications  Labs  Result Review Imaging  Media :23}     Toshia Kebede presents to [facility] for Annual Exam.  She is  and has 2 children--1 local and 1 in [place]. She works full time for [employer] and also helps to watch her 2 grandchildren that are local.  She sees the gynecologist routinely. Her last mammogram was 3/2024 and she is scheduled in a few weeks. She is menopausal and is on estradiol patch, progesterone oral and compounded topical estrogen vaginally. Her last colon cancer screening was cologuard done 9/2022 and was negative. She has never been a smoker. She exercises on average once a week.     Their chronic medical conditions were reviewed and are stable unless noted otherwise below.    She is also here for follow up hypothyroidism. She is on levothyroxine 125 mcg daily. She has been stable. She denies any recent changes in mood or energy.     She is also here for follow up of anxiety. She is currently on escitalopram 5mg daily and buspar 10 (1/2 twice a day). She is doing well on medication. She denies any depression or suicidal ideation.     History of Present Illness     Social History     Socioeconomic History    Marital status:    Tobacco Use    Smoking status: Never    Smokeless tobacco: Never   Vaping Use    Vaping status: Never Used   Substance and Sexual Activity    Alcohol use: Yes     Alcohol/week: 6.0 standard drinks of alcohol     Types: 6 Glasses of wine per week     Comment: occasionally    Drug use: No    Sexual activity: Yes     Partners: Male     Birth control/protection: Post-menopausal, None     Comment:  only        Review of Systems   Constitutional:  Negative for fatigue.   HENT:  Negative for ear pain and sore throat.    Eyes:  Negative for pain.   Respiratory:  Negative for  "cough and shortness of breath.    Cardiovascular:  Negative for chest pain and palpitations.   Gastrointestinal:  Negative for abdominal pain, constipation and diarrhea.   Genitourinary:  Negative for dysuria.   Musculoskeletal:  Positive for arthralgias (left knee pain with running, can do elliptical).   Skin:  Negative for color change (sees dermatologist).   Allergic/Immunologic: Negative for environmental allergies.   Neurological:  Negative for dizziness and headaches.   Psychiatric/Behavioral:  Negative for dysphoric mood and suicidal ideas. Sleep disturbance: improved on medicaiton.The patient is nervous/anxious.         Objective       Vital Signs:   /78   Pulse 51   Ht 172.7 cm (68\")   Wt 64.1 kg (141 lb 4.8 oz)   SpO2 98%   BMI 21.48 kg/m²     Body mass index is 21.48 kg/m².     PHQ-9 Depression Screening  Little interest or pleasure in doing things?     Feeling down, depressed, or hopeless?     PHQ-2 Total Score     Trouble falling or staying asleep, or sleeping too much?     Feeling tired or having little energy?     Poor appetite or overeating?     Feeling bad about yourself - or that you are a failure or have let yourself or your family down?     Trouble concentrating on things, such as reading the newspaper or watching television?     Moving or speaking so slowly that other people could have noticed? Or the opposite - being so fidgety or restless that you have been moving around a lot more than usual?     Thoughts that you would be better off dead, or of hurting yourself in some way?     PHQ-9 Total Score     If you checked off any problems, how difficult have these problems made it for you to do your work, take care of things at home, or get along with other people?        Physical Exam  Constitutional:       General: She is not in acute distress.  HENT:      Head: Normocephalic and atraumatic.      Nose: No rhinorrhea.      Mouth/Throat:      Mouth: Mucous membranes are moist.   Eyes:     "  Conjunctiva/sclera: Conjunctivae normal.   Cardiovascular:      Rate and Rhythm: Normal rate and regular rhythm.      Pulses: Normal pulses.      Heart sounds: No murmur heard.  Pulmonary:      Effort: No respiratory distress.      Breath sounds: Normal breath sounds.   Abdominal:      General: There is no distension.      Palpations: Abdomen is soft.      Tenderness: There is no abdominal tenderness.   Musculoskeletal:      Right lower leg: No edema.      Left lower leg: No edema.   Lymphadenopathy:      Cervical: No cervical adenopathy.   Skin:     General: Skin is warm.      Findings: No rash.   Neurological:      General: No focal deficit present.      Mental Status: She is alert.   Psychiatric:         Behavior: Behavior normal.          Result Review  Data Reviewed:{ Labs  Result Review  Imaging  Med Tab  Media :23}                Assessment and Plan {CC Problem List  Visit Diagnosis  ROS  Review (Popup)  Zenverge Maintenance  Quality  BestPractice  Medications  SmartSets  SnapShot Encounters  Media :23}   Diagnoses and all orders for this visit:    1. Well adult exam (Primary)  -     CBC & Differential  -     Comprehensive Metabolic Panel  -     Lipid Panel  -     TSH Rfx On Abnormal To Free T4    2. Hypothyroidism, unspecified type  Comments:  Stable on levothyroxine.  Orders:  -     TSH Rfx On Abnormal To Free T4    3. Anxiety  Comments:  Well controlled on escitalopram and buspar/  Orders:  -     TSH Rfx On Abnormal To Free T4    4. Post-menopausal    5. Screening for colon cancer  -     Cologuard - Stool, Per Rectum; Future    Other orders  -     Pneumococcal Conjugate Vaccine 20-Valent All        Patient Instructions   Continue your current medications.   Aim for 150 minutes of aerobic exercise weekly as tolerated.  Continue yearly mammogram and routine follow up with gynecologist.  Colon cancer screening due in September. Order placed.  You had lab tests today. You should receive a call  or my chart message with your test results. If you have not received your results in the next 7-10 days, please contact the office.       Routine health maintenance, immunizations, and cancer screenings were discussed and encouraged.    Patient was given instructions and counseling regarding her condition or for health maintenance advice on the AVS.       Return in about 6 months (around 9/27/2025) for Recheck.    Claudia Robbins MD

## 2025-03-28 LAB
ALBUMIN SERPL-MCNC: 4.3 G/DL (ref 3.5–5.2)
ALBUMIN/GLOB SERPL: 1.7 G/DL
ALP SERPL-CCNC: 63 U/L (ref 39–117)
ALT SERPL-CCNC: 15 U/L (ref 1–33)
AST SERPL-CCNC: 20 U/L (ref 1–32)
BASOPHILS # BLD AUTO: 0.06 10*3/MM3 (ref 0–0.2)
BASOPHILS NFR BLD AUTO: 1.3 % (ref 0–1.5)
BILIRUB SERPL-MCNC: 0.7 MG/DL (ref 0–1.2)
BUN SERPL-MCNC: 14 MG/DL (ref 8–23)
BUN/CREAT SERPL: 17.9 (ref 7–25)
CALCIUM SERPL-MCNC: 9.6 MG/DL (ref 8.6–10.5)
CHLORIDE SERPL-SCNC: 104 MMOL/L (ref 98–107)
CHOLEST SERPL-MCNC: 159 MG/DL (ref 0–200)
CO2 SERPL-SCNC: 29.8 MMOL/L (ref 22–29)
CREAT SERPL-MCNC: 0.78 MG/DL (ref 0.57–1)
EGFRCR SERPLBLD CKD-EPI 2021: 83.9 ML/MIN/1.73
EOSINOPHIL # BLD AUTO: 0.04 10*3/MM3 (ref 0–0.4)
EOSINOPHIL NFR BLD AUTO: 0.9 % (ref 0.3–6.2)
ERYTHROCYTE [DISTWIDTH] IN BLOOD BY AUTOMATED COUNT: 11.1 % (ref 12.3–15.4)
GLOBULIN SER CALC-MCNC: 2.6 GM/DL
GLUCOSE SERPL-MCNC: 86 MG/DL (ref 65–99)
HCT VFR BLD AUTO: 41 % (ref 34–46.6)
HDLC SERPL-MCNC: 74 MG/DL (ref 40–60)
HGB BLD-MCNC: 13.8 G/DL (ref 12–15.9)
IMM GRANULOCYTES # BLD AUTO: 0.02 10*3/MM3 (ref 0–0.05)
IMM GRANULOCYTES NFR BLD AUTO: 0.4 % (ref 0–0.5)
LDLC SERPL CALC-MCNC: 73 MG/DL (ref 0–100)
LYMPHOCYTES # BLD AUTO: 1.51 10*3/MM3 (ref 0.7–3.1)
LYMPHOCYTES NFR BLD AUTO: 32.5 % (ref 19.6–45.3)
MCH RBC QN AUTO: 33.4 PG (ref 26.6–33)
MCHC RBC AUTO-ENTMCNC: 33.7 G/DL (ref 31.5–35.7)
MCV RBC AUTO: 99.3 FL (ref 79–97)
MONOCYTES # BLD AUTO: 0.5 10*3/MM3 (ref 0.1–0.9)
MONOCYTES NFR BLD AUTO: 10.8 % (ref 5–12)
NEUTROPHILS # BLD AUTO: 2.51 10*3/MM3 (ref 1.7–7)
NEUTROPHILS NFR BLD AUTO: 54.1 % (ref 42.7–76)
NRBC BLD AUTO-RTO: 0 /100 WBC (ref 0–0.2)
PLATELET # BLD AUTO: 312 10*3/MM3 (ref 140–450)
POTASSIUM SERPL-SCNC: 4.6 MMOL/L (ref 3.5–5.2)
PROT SERPL-MCNC: 6.9 G/DL (ref 6–8.5)
RBC # BLD AUTO: 4.13 10*6/MM3 (ref 3.77–5.28)
SODIUM SERPL-SCNC: 140 MMOL/L (ref 136–145)
TRIGL SERPL-MCNC: 56 MG/DL (ref 0–150)
TSH SERPL DL<=0.005 MIU/L-ACNC: 1.39 UIU/ML (ref 0.27–4.2)
VLDLC SERPL CALC-MCNC: 12 MG/DL (ref 5–40)
WBC # BLD AUTO: 4.64 10*3/MM3 (ref 3.4–10.8)

## 2025-04-14 RX ORDER — BUSPIRONE HYDROCHLORIDE 10 MG/1
TABLET ORAL
Qty: 60 TABLET | Refills: 1 | Status: SHIPPED | OUTPATIENT
Start: 2025-04-14

## 2025-05-27 RX ORDER — ESCITALOPRAM OXALATE 5 MG/1
5 TABLET ORAL DAILY
Qty: 60 TABLET | Refills: 0 | Status: SHIPPED | OUTPATIENT
Start: 2025-05-27

## 2025-06-16 DIAGNOSIS — E03.9 HYPOTHYROIDISM, UNSPECIFIED TYPE: ICD-10-CM

## 2025-06-16 RX ORDER — LEVOTHYROXINE SODIUM 125 MCG
125 TABLET ORAL DAILY
Qty: 30 TABLET | Refills: 4 | Status: SHIPPED | OUTPATIENT
Start: 2025-06-16

## 2025-06-23 RX ORDER — BUSPIRONE HYDROCHLORIDE 10 MG/1
TABLET ORAL
Qty: 60 TABLET | Refills: 1 | Status: SHIPPED | OUTPATIENT
Start: 2025-06-23

## 2025-07-05 DIAGNOSIS — I49.3 PVC'S (PREMATURE VENTRICULAR CONTRACTIONS): ICD-10-CM

## 2025-07-07 RX ORDER — ACEBUTOLOL HYDROCHLORIDE 200 MG/1
200 CAPSULE ORAL DAILY
Qty: 90 CAPSULE | Refills: 3 | Status: SHIPPED | OUTPATIENT
Start: 2025-07-07

## 2025-07-25 RX ORDER — ESCITALOPRAM OXALATE 5 MG/1
5 TABLET ORAL DAILY
Qty: 60 TABLET | Refills: 0 | Status: SHIPPED | OUTPATIENT
Start: 2025-07-25

## 2025-08-15 ENCOUNTER — OFFICE VISIT (OUTPATIENT)
Age: 65
End: 2025-08-15
Payer: COMMERCIAL

## 2025-08-15 VITALS
DIASTOLIC BLOOD PRESSURE: 78 MMHG | BODY MASS INDEX: 21.07 KG/M2 | HEART RATE: 57 BPM | SYSTOLIC BLOOD PRESSURE: 104 MMHG | HEIGHT: 68 IN | WEIGHT: 139 LBS

## 2025-08-15 DIAGNOSIS — I77.3: ICD-10-CM

## 2025-08-15 DIAGNOSIS — I49.3 PVC'S (PREMATURE VENTRICULAR CONTRACTIONS): Primary | ICD-10-CM

## 2025-08-15 PROCEDURE — 93000 ELECTROCARDIOGRAM COMPLETE: CPT | Performed by: INTERNAL MEDICINE

## 2025-08-15 PROCEDURE — 99213 OFFICE O/P EST LOW 20 MIN: CPT | Performed by: INTERNAL MEDICINE

## 2025-08-15 RX ORDER — PROGESTERONE 200 MG/1
200 CAPSULE ORAL
COMMUNITY

## 2025-08-19 RX ORDER — BUSPIRONE HYDROCHLORIDE 10 MG/1
10 TABLET ORAL 2 TIMES DAILY
Qty: 60 TABLET | Refills: 1 | Status: SHIPPED | OUTPATIENT
Start: 2025-08-19